# Patient Record
Sex: FEMALE | Race: WHITE | Employment: UNEMPLOYED | URBAN - METROPOLITAN AREA
[De-identification: names, ages, dates, MRNs, and addresses within clinical notes are randomized per-mention and may not be internally consistent; named-entity substitution may affect disease eponyms.]

---

## 2023-07-11 ENCOUNTER — TELEPHONE (OUTPATIENT)
Dept: BARIATRICS/WEIGHT MGMT | Age: 68
End: 2023-07-11

## 2023-07-18 ENCOUNTER — TELEPHONE (OUTPATIENT)
Dept: BARIATRICS/WEIGHT MGMT | Age: 68
End: 2023-07-18

## 2024-04-11 ENCOUNTER — HOSPITAL ENCOUNTER (EMERGENCY)
Facility: HOSPITAL | Age: 69
Discharge: HOME | End: 2024-04-11
Attending: EMERGENCY MEDICINE
Payer: MEDICARE

## 2024-04-11 ENCOUNTER — APPOINTMENT (OUTPATIENT)
Dept: RADIOLOGY | Facility: HOSPITAL | Age: 69
End: 2024-04-11
Payer: MEDICARE

## 2024-04-11 VITALS
OXYGEN SATURATION: 97 % | HEIGHT: 66 IN | SYSTOLIC BLOOD PRESSURE: 134 MMHG | RESPIRATION RATE: 16 BRPM | HEART RATE: 65 BPM | BODY MASS INDEX: 28.1 KG/M2 | WEIGHT: 174.82 LBS | TEMPERATURE: 97.7 F | DIASTOLIC BLOOD PRESSURE: 75 MMHG

## 2024-04-11 DIAGNOSIS — R51.9 ACUTE NONINTRACTABLE HEADACHE, UNSPECIFIED HEADACHE TYPE: Primary | ICD-10-CM

## 2024-04-11 PROCEDURE — 96375 TX/PRO/DX INJ NEW DRUG ADDON: CPT

## 2024-04-11 PROCEDURE — 96361 HYDRATE IV INFUSION ADD-ON: CPT

## 2024-04-11 PROCEDURE — 96372 THER/PROPH/DIAG INJ SC/IM: CPT | Performed by: EMERGENCY MEDICINE

## 2024-04-11 PROCEDURE — 70450 CT HEAD/BRAIN W/O DYE: CPT | Performed by: SURGERY

## 2024-04-11 PROCEDURE — 2500000004 HC RX 250 GENERAL PHARMACY W/ HCPCS (ALT 636 FOR OP/ED): Performed by: EMERGENCY MEDICINE

## 2024-04-11 PROCEDURE — 70450 CT HEAD/BRAIN W/O DYE: CPT

## 2024-04-11 PROCEDURE — 99284 EMERGENCY DEPT VISIT MOD MDM: CPT | Mod: 25

## 2024-04-11 PROCEDURE — 96374 THER/PROPH/DIAG INJ IV PUSH: CPT

## 2024-04-11 RX ORDER — KETOROLAC TROMETHAMINE 30 MG/ML
30 INJECTION, SOLUTION INTRAMUSCULAR; INTRAVENOUS ONCE
Status: COMPLETED | OUTPATIENT
Start: 2024-04-11 | End: 2024-04-11

## 2024-04-11 RX ORDER — PROCHLORPERAZINE EDISYLATE 5 MG/ML
10 INJECTION INTRAMUSCULAR; INTRAVENOUS ONCE
Status: COMPLETED | OUTPATIENT
Start: 2024-04-11 | End: 2024-04-11

## 2024-04-11 RX ADMIN — SODIUM CHLORIDE 1000 ML: 900 INJECTION, SOLUTION INTRAVENOUS at 05:54

## 2024-04-11 RX ADMIN — PROCHLORPERAZINE EDISYLATE 10 MG: 5 INJECTION INTRAMUSCULAR; INTRAVENOUS at 05:54

## 2024-04-11 RX ADMIN — KETOROLAC TROMETHAMINE 30 MG: 30 INJECTION, SOLUTION INTRAMUSCULAR at 05:54

## 2024-04-11 ASSESSMENT — COLUMBIA-SUICIDE SEVERITY RATING SCALE - C-SSRS
1. IN THE PAST MONTH, HAVE YOU WISHED YOU WERE DEAD OR WISHED YOU COULD GO TO SLEEP AND NOT WAKE UP?: NO
6. HAVE YOU EVER DONE ANYTHING, STARTED TO DO ANYTHING, OR PREPARED TO DO ANYTHING TO END YOUR LIFE?: NO
2. HAVE YOU ACTUALLY HAD ANY THOUGHTS OF KILLING YOURSELF?: NO

## 2024-04-11 ASSESSMENT — PAIN SCALES - GENERAL
PAINLEVEL_OUTOF10: 2
PAINLEVEL_OUTOF10: 6

## 2024-04-11 ASSESSMENT — PAIN DESCRIPTION - PAIN TYPE
TYPE: ACUTE PAIN
TYPE: ACUTE PAIN

## 2024-04-11 ASSESSMENT — PAIN - FUNCTIONAL ASSESSMENT
PAIN_FUNCTIONAL_ASSESSMENT: 0-10
PAIN_FUNCTIONAL_ASSESSMENT: 0-10

## 2024-04-11 ASSESSMENT — PAIN DESCRIPTION - LOCATION
LOCATION: HEAD
LOCATION: HEAD

## 2024-04-11 ASSESSMENT — PAIN DESCRIPTION - FREQUENCY: FREQUENCY: CONSTANT/CONTINUOUS

## 2024-04-11 ASSESSMENT — PAIN DESCRIPTION - DESCRIPTORS: DESCRIPTORS: HEADACHE

## 2024-04-11 ASSESSMENT — PAIN DESCRIPTION - PROGRESSION: CLINICAL_PROGRESSION: GRADUALLY IMPROVING

## 2024-04-11 ASSESSMENT — PAIN DESCRIPTION - ORIENTATION: ORIENTATION: RIGHT

## 2024-04-11 ASSESSMENT — PAIN DESCRIPTION - ONSET: ONSET: ONGOING

## 2024-04-11 NOTE — ED PROVIDER NOTES
HPI   Chief Complaint   Patient presents with    Headache     Pt started to have a headache 2 days ago. Pt has been taking Excedrin and the pain comes and goes. Pt states the pain has gotten worse.       HPI  69-year-old female presents complaining of a headache.  The patient had a headache for the last couple days.  She had it for 2 days and then yesterday it went away completely and she felt fine and then she woke up this morning with a headache.  It seems to be in the frontal region.  She is also been struggling with some TMJ and some ear pain that she has had over the last month.  She saw ENT who told her that she had a hole in her eardrum which she could repair but she has not pursued that yet.  She been taking Excedrin at home for headache.  Which helps but does not make it go away.  No nausea or vomiting.  No change in vision.  No trauma.  5 out of 10 pain.  No other complaints.                  No data recorded                   Patient History   History reviewed. No pertinent past medical history.  History reviewed. No pertinent surgical history.  No family history on file.  Social History     Tobacco Use    Smoking status: Not on file    Smokeless tobacco: Not on file   Substance Use Topics    Alcohol use: Not on file    Drug use: Not on file       Physical Exam   ED Triage Vitals [04/11/24 0512]   Temperature Heart Rate Respirations BP   36.5 °C (97.7 °F) 65 16 134/75      Pulse Ox Temp Source Heart Rate Source Patient Position   97 % Temporal Monitor Sitting      BP Location FiO2 (%)     Right arm --       Physical Exam  General:  Awake, alert, no acute distress.  Head: Normocephalic, Atraumatic, no tenderness palpation over right temporal artery  Eyes: Pupils are equal and reactive to light and accommodation extraocular motors intact  Neck: Supple, trachea midline, no stridor, no meningismus  Skin: Warm and dry, no rashes   Lungs: Clear to auscultation bilaterally no acute respiratory distress, speaking  in full sentences without difficulty  CV: Regular Rate Rhythm with no obvious murmurs gallops rubs noted, no jugular venous distention, no pedal edema   Neuro:  No gross focal neurologic deficits, NIH is 0  Musculoskeletal:  Full range of motion in all 4 extremities  Psychiatric:  Alert oriented x 3, Good insight into condition.  ED Course & MDM   Diagnoses as of 04/11/24 2142   Acute nonintractable headache, unspecified headache type       Medical Decision Making  Given the patient's age CT head is ordered.  Patient be treated with IV fluids, Toradol, Compazine.  Patient will be endorsed to the oncoming physician.    Procedure  Procedures     Michael Ferrari,   04/11/24 0539       Michael Ferrari,   04/11/24 2147

## 2024-04-11 NOTE — DISCHARGE INSTRUCTIONS
Continue taking your medications at home as needed, return to the ED for any new or concerning symptoms you feel requires emergent evaluation including infectious signs or symptoms like fevers, neck stiffness with worsening headache, confusion which may be signs of meningitis, headache with any new neurologic symptoms like numbness, weakness or paralysis, vision loss, slurred speech, difficulty speaking or swallowing which would require additional evaluation in the emergency department and may be signs of stroke.  Otherwise, you can follow-up with your primary doctor in case of persistent mild symptoms for further treatment recommendations.

## 2024-04-11 NOTE — ED PROVIDER NOTES
Care the patient was sent to me at 6 AM pending CT results and reassessment.  CT shows no acute intracranial process, no evidence of mass effect or intracranial hemorrhage or structural abnormality causing her headaches.  She feels much better after treatment in the emergency department and feels that she is ready to go home.  Presentation today is suggestive of migraine headache given response to migraine cocktail and otherwise benign evaluation.  She was referred to a primary care physician and neurology for follow-up given the frequency of her headaches.  She was discharged in improved condition.     Blanco Ramesh DO  04/11/24 0643

## 2024-11-20 ENCOUNTER — APPOINTMENT (OUTPATIENT)
Dept: PRIMARY CARE | Facility: CLINIC | Age: 69
End: 2024-11-20
Payer: MEDICARE

## 2024-11-20 VITALS
OXYGEN SATURATION: 95 % | HEIGHT: 66 IN | DIASTOLIC BLOOD PRESSURE: 73 MMHG | WEIGHT: 181 LBS | SYSTOLIC BLOOD PRESSURE: 113 MMHG | HEART RATE: 81 BPM | BODY MASS INDEX: 29.09 KG/M2

## 2024-11-20 DIAGNOSIS — Z13.820 ENCOUNTER FOR OSTEOPOROSIS SCREENING IN ASYMPTOMATIC POSTMENOPAUSAL PATIENT: ICD-10-CM

## 2024-11-20 DIAGNOSIS — Z78.0 ENCOUNTER FOR OSTEOPOROSIS SCREENING IN ASYMPTOMATIC POSTMENOPAUSAL PATIENT: ICD-10-CM

## 2024-11-20 DIAGNOSIS — Z13.6 SCREENING FOR HEART DISEASE: ICD-10-CM

## 2024-11-20 DIAGNOSIS — Z12.31 ENCOUNTER FOR SCREENING MAMMOGRAM FOR BREAST CANCER: ICD-10-CM

## 2024-11-20 DIAGNOSIS — Z00.00 MEDICARE ANNUAL WELLNESS VISIT, SUBSEQUENT: Primary | ICD-10-CM

## 2024-11-20 DIAGNOSIS — Z12.11 ENCOUNTER FOR SCREENING FOR MALIGNANT NEOPLASM OF COLON: ICD-10-CM

## 2024-11-20 DIAGNOSIS — R10.10 PAIN OF UPPER ABDOMEN: ICD-10-CM

## 2024-11-20 PROCEDURE — 1159F MED LIST DOCD IN RCRD: CPT

## 2024-11-20 PROCEDURE — 1160F RVW MEDS BY RX/DR IN RCRD: CPT

## 2024-11-20 PROCEDURE — G0439 PPPS, SUBSEQ VISIT: HCPCS

## 2024-11-20 PROCEDURE — 99213 OFFICE O/P EST LOW 20 MIN: CPT

## 2024-11-20 PROCEDURE — 1170F FXNL STATUS ASSESSED: CPT

## 2024-11-20 PROCEDURE — 1123F ACP DISCUSS/DSCN MKR DOCD: CPT

## 2024-11-20 PROCEDURE — 1036F TOBACCO NON-USER: CPT

## 2024-11-20 PROCEDURE — 1158F ADVNC CARE PLAN TLK DOCD: CPT

## 2024-11-20 PROCEDURE — 3008F BODY MASS INDEX DOCD: CPT

## 2024-11-20 ASSESSMENT — ACTIVITIES OF DAILY LIVING (ADL)
TAKING_MEDICATION: INDEPENDENT
DRESSING: INDEPENDENT
DOING_HOUSEWORK: INDEPENDENT
BATHING: INDEPENDENT
GROCERY_SHOPPING: INDEPENDENT
MANAGING_FINANCES: INDEPENDENT

## 2024-11-20 ASSESSMENT — ENCOUNTER SYMPTOMS
OCCASIONAL FEELINGS OF UNSTEADINESS: 0
LOSS OF SENSATION IN FEET: 0
DEPRESSION: 0

## 2024-11-20 ASSESSMENT — PATIENT HEALTH QUESTIONNAIRE - PHQ9
1. LITTLE INTEREST OR PLEASURE IN DOING THINGS: NOT AT ALL
SUM OF ALL RESPONSES TO PHQ9 QUESTIONS 1 AND 2: 0
2. FEELING DOWN, DEPRESSED OR HOPELESS: NOT AT ALL

## 2024-11-20 NOTE — PROGRESS NOTES
Subjective   Patient ID: Patsy Menendez is a 69 y.o. female who presents for Medicare Annual Wellness Visit Subsequent (Due for medicare wellness visit, mammogram, and colorectal cancer screening. She would like to discuss abdominal pain).    HPI     Pt is here for annual wellness.  Reports overall health is good. Her one concern is that she gets spasms in her abdomen and after the spasm she then has a bruised feeling to that area for a few days after. Denies fever or chills. Denies constipation or diarrhea. Does not take anything for the pain. Refusing vaccines.     Do you take any herbs or supplements that were not prescribed by a doctor? no  Are you taking calcium supplements? no  Are you taking aspirin daily? no  Colonoscopy: cologuard ordered  Fasting blood work: ordered  Last eye exam: two years ago  Last dental Exam: every six months  Exercise:4 times a week  Mood:stable  Sleep: not horrible 6-7 hours  Diet:  Fairly healthy  Occupation:  retired  Do you have pain that bothers you in your daily life? yes    1. Patient Counseling:  --Nutrition: Stressed importance of moderation in sodium/caffeine intake, saturated fat and cholesterol, caloric balance, sufficient intake of fresh fruits, vegetables, fiber, calcium, iron, and 1 mg of folate supplement per day (for females capable of pregnancy).  --Discussed the issue of estrogen replacement, calcium supplement, and the daily use of baby aspirin as appropriate per pt.  --Exercise: Stressed the importance of regular exercise.   --Substance Abuse: Discussed cessation/primary prevention of tobacco, alcohol, or other drug use; driving or other dangerous activities under the influence; availability of treatment for abuse.    --Sexuality: Discussed sexually transmitted diseases, partner selection, use of condoms, avoidance of unintended pregnancy  and contraceptive alternatives.   --Injury prevention: Discussed safety belts, safety helmets, smoke detector, smoking  "near bedding or upholstery.   --Dental health: Discussed importance of regular tooth brushing, flossing, and dental visits.  --Immunizations reviewed.  --Discussed benefits of colon cancer screening.  --After hours service discussed with patient  2 Discussed the patient's BMI.  The BMI is above average. No BMI management plan is appropriate.  3 Follow up in one year    Review of Systems    Objective   /73   Pulse 81   Ht 1.676 m (5' 6\")   Wt 82.1 kg (181 lb)   SpO2 95%   BMI 29.21 kg/m²     Physical Exam  Vitals reviewed.   Constitutional:       Appearance: Normal appearance.   Cardiovascular:      Rate and Rhythm: Normal rate and regular rhythm.      Pulses: Normal pulses.      Heart sounds: Normal heart sounds.   Pulmonary:      Effort: Pulmonary effort is normal.      Breath sounds: Normal breath sounds.   Neurological:      General: No focal deficit present.      Mental Status: She is alert and oriented to person, place, and time.   Psychiatric:         Mood and Affect: Mood normal.         Behavior: Behavior normal.       Assessment/Plan   Diagnoses and all orders for this visit:  Medicare annual wellness visit, subsequent  -     Comprehensive metabolic panel; Future  Encounter for screening mammogram for breast cancer  -     BI mammo bilateral screening tomosynthesis; Future  Pain of upper abdomen  -     US abdomen complete; Future  -     CBC and Auto Differential; Future  Encounter for screening for malignant neoplasm of colon  -     Cologuard® colon cancer screening; Future  Screening for heart disease  -     Lipid panel; Future  Encounter for osteoporosis screening in asymptomatic postmenopausal patient  -     XR DEXA bone density; Future       "

## 2024-11-22 ENCOUNTER — HOSPITAL ENCOUNTER (OUTPATIENT)
Dept: RADIOLOGY | Facility: HOSPITAL | Age: 69
Discharge: HOME | End: 2024-11-22
Payer: MEDICARE

## 2024-11-22 ENCOUNTER — LAB (OUTPATIENT)
Dept: LAB | Facility: LAB | Age: 69
End: 2024-11-22
Payer: MEDICARE

## 2024-11-22 VITALS — BODY MASS INDEX: 28.93 KG/M2 | HEIGHT: 66 IN | WEIGHT: 180 LBS

## 2024-11-22 DIAGNOSIS — R10.10 PAIN OF UPPER ABDOMEN: ICD-10-CM

## 2024-11-22 DIAGNOSIS — Z12.31 ENCOUNTER FOR SCREENING MAMMOGRAM FOR BREAST CANCER: ICD-10-CM

## 2024-11-22 DIAGNOSIS — Z00.00 MEDICARE ANNUAL WELLNESS VISIT, SUBSEQUENT: ICD-10-CM

## 2024-11-22 DIAGNOSIS — Z13.6 SCREENING FOR HEART DISEASE: ICD-10-CM

## 2024-11-22 DIAGNOSIS — Z13.820 ENCOUNTER FOR OSTEOPOROSIS SCREENING IN ASYMPTOMATIC POSTMENOPAUSAL PATIENT: ICD-10-CM

## 2024-11-22 DIAGNOSIS — Z78.0 ENCOUNTER FOR OSTEOPOROSIS SCREENING IN ASYMPTOMATIC POSTMENOPAUSAL PATIENT: ICD-10-CM

## 2024-11-22 LAB
ALBUMIN SERPL BCP-MCNC: 4.2 G/DL (ref 3.4–5)
ALP SERPL-CCNC: 109 U/L (ref 33–136)
ALT SERPL W P-5'-P-CCNC: 13 U/L (ref 7–45)
ANION GAP SERPL CALC-SCNC: 12 MMOL/L (ref 10–20)
AST SERPL W P-5'-P-CCNC: 12 U/L (ref 9–39)
BASOPHILS # BLD AUTO: 0.08 X10*3/UL (ref 0–0.1)
BASOPHILS NFR BLD AUTO: 0.9 %
BILIRUB SERPL-MCNC: 0.7 MG/DL (ref 0–1.2)
BUN SERPL-MCNC: 18 MG/DL (ref 6–23)
CALCIUM SERPL-MCNC: 9.3 MG/DL (ref 8.6–10.3)
CHLORIDE SERPL-SCNC: 103 MMOL/L (ref 98–107)
CHOLEST SERPL-MCNC: 174 MG/DL (ref 0–199)
CHOLESTEROL/HDL RATIO: 3.8
CO2 SERPL-SCNC: 29 MMOL/L (ref 21–32)
CREAT SERPL-MCNC: 0.77 MG/DL (ref 0.5–1.05)
EGFRCR SERPLBLD CKD-EPI 2021: 83 ML/MIN/1.73M*2
EOSINOPHIL # BLD AUTO: 0.17 X10*3/UL (ref 0–0.7)
EOSINOPHIL NFR BLD AUTO: 2 %
ERYTHROCYTE [DISTWIDTH] IN BLOOD BY AUTOMATED COUNT: 13.6 % (ref 11.5–14.5)
GLUCOSE SERPL-MCNC: 87 MG/DL (ref 74–99)
HCT VFR BLD AUTO: 48 % (ref 36–46)
HDLC SERPL-MCNC: 45.4 MG/DL
HGB BLD-MCNC: 15.5 G/DL (ref 12–16)
IMM GRANULOCYTES # BLD AUTO: 0.05 X10*3/UL (ref 0–0.7)
IMM GRANULOCYTES NFR BLD AUTO: 0.6 % (ref 0–0.9)
LDLC SERPL CALC-MCNC: 113 MG/DL
LYMPHOCYTES # BLD AUTO: 1.99 X10*3/UL (ref 1.2–4.8)
LYMPHOCYTES NFR BLD AUTO: 23.2 %
MCH RBC QN AUTO: 27.8 PG (ref 26–34)
MCHC RBC AUTO-ENTMCNC: 32.3 G/DL (ref 32–36)
MCV RBC AUTO: 86 FL (ref 80–100)
MONOCYTES # BLD AUTO: 0.61 X10*3/UL (ref 0.1–1)
MONOCYTES NFR BLD AUTO: 7.1 %
NEUTROPHILS # BLD AUTO: 5.66 X10*3/UL (ref 1.2–7.7)
NEUTROPHILS NFR BLD AUTO: 66.2 %
NON HDL CHOLESTEROL: 129 MG/DL (ref 0–149)
NRBC BLD-RTO: 0 /100 WBCS (ref 0–0)
PLATELET # BLD AUTO: 323 X10*3/UL (ref 150–450)
POTASSIUM SERPL-SCNC: 5.2 MMOL/L (ref 3.5–5.3)
PROT SERPL-MCNC: 6.3 G/DL (ref 6.4–8.2)
RBC # BLD AUTO: 5.57 X10*6/UL (ref 4–5.2)
SODIUM SERPL-SCNC: 139 MMOL/L (ref 136–145)
TRIGL SERPL-MCNC: 76 MG/DL (ref 0–149)
VLDL: 15 MG/DL (ref 0–40)
WBC # BLD AUTO: 8.6 X10*3/UL (ref 4.4–11.3)

## 2024-11-22 PROCEDURE — 77080 DXA BONE DENSITY AXIAL: CPT

## 2024-11-22 PROCEDURE — 76700 US EXAM ABDOM COMPLETE: CPT

## 2024-11-22 PROCEDURE — 85025 COMPLETE CBC W/AUTO DIFF WBC: CPT

## 2024-11-22 PROCEDURE — 80061 LIPID PANEL: CPT

## 2024-11-22 PROCEDURE — 36415 COLL VENOUS BLD VENIPUNCTURE: CPT

## 2024-11-22 PROCEDURE — 77067 SCR MAMMO BI INCL CAD: CPT

## 2024-11-22 PROCEDURE — 80053 COMPREHEN METABOLIC PANEL: CPT

## 2024-11-26 ENCOUNTER — HOSPITAL ENCOUNTER (OUTPATIENT)
Dept: RADIOLOGY | Facility: EXTERNAL LOCATION | Age: 69
Discharge: HOME | End: 2024-11-26

## 2024-11-26 DIAGNOSIS — Z12.31 ENCOUNTER FOR SCREENING MAMMOGRAM FOR BREAST CANCER: ICD-10-CM

## 2024-11-26 DIAGNOSIS — N28.1 KIDNEY CYSTS: Primary | ICD-10-CM

## 2024-11-27 DIAGNOSIS — R92.8 ABNORMAL MAMMOGRAM OF LEFT BREAST: Primary | ICD-10-CM

## 2024-12-06 LAB — NONINV COLON CA DNA+OCC BLD SCRN STL QL: NEGATIVE

## 2024-12-20 ENCOUNTER — HOSPITAL ENCOUNTER (OUTPATIENT)
Dept: RADIOLOGY | Facility: HOSPITAL | Age: 69
Discharge: HOME | End: 2024-12-20
Payer: MEDICARE

## 2024-12-20 DIAGNOSIS — R92.8 ABNORMAL MAMMOGRAM OF LEFT BREAST: ICD-10-CM

## 2024-12-20 PROCEDURE — 77061 BREAST TOMOSYNTHESIS UNI: CPT | Mod: LT

## 2024-12-20 PROCEDURE — 77065 DX MAMMO INCL CAD UNI: CPT | Mod: LEFT SIDE | Performed by: RADIOLOGY

## 2024-12-20 PROCEDURE — G0279 TOMOSYNTHESIS, MAMMO: HCPCS | Mod: LEFT SIDE | Performed by: RADIOLOGY

## 2025-01-10 ENCOUNTER — TELEPHONE (OUTPATIENT)
Dept: PRIMARY CARE | Facility: CLINIC | Age: 70
End: 2025-01-10
Payer: MEDICARE

## 2025-01-10 DIAGNOSIS — R10.9 LEFT SIDED ABDOMINAL PAIN: Primary | ICD-10-CM

## 2025-01-10 NOTE — TELEPHONE ENCOUNTER
Pt called asking for breast US results from 12/20/24  I checked to see if they were in and I was canceled. It looked like the appt was rescheduled for 1/13/25 at 1:45. I called and LVM to inform patient of this information.

## 2025-01-10 NOTE — TELEPHONE ENCOUNTER
Pt came to office and Celina spoke to Patsy about the abnormal mammogram. Pt was informed to get left breast US done

## 2025-01-13 ENCOUNTER — HOSPITAL ENCOUNTER (OUTPATIENT)
Dept: RADIOLOGY | Facility: HOSPITAL | Age: 70
Discharge: HOME | End: 2025-01-13
Payer: MEDICARE

## 2025-01-13 DIAGNOSIS — R10.9 LEFT SIDED ABDOMINAL PAIN: ICD-10-CM

## 2025-01-13 PROCEDURE — 76705 ECHO EXAM OF ABDOMEN: CPT

## 2025-01-13 PROCEDURE — 76642 ULTRASOUND BREAST LIMITED: CPT | Mod: LT

## 2025-01-13 PROCEDURE — 76982 USE 1ST TARGET LESION: CPT | Mod: LT

## 2025-01-13 PROCEDURE — 76705 ECHO EXAM OF ABDOMEN: CPT | Performed by: STUDENT IN AN ORGANIZED HEALTH CARE EDUCATION/TRAINING PROGRAM

## 2025-01-14 ENCOUNTER — APPOINTMENT (OUTPATIENT)
Dept: RADIOLOGY | Facility: HOSPITAL | Age: 70
End: 2025-01-14
Payer: MEDICARE

## 2025-01-20 NOTE — PROGRESS NOTES
Subjective   Patient ID: Patsy Menendez is a 69 y.o. female who presents for left US breast biopsy consult Bx 1/30.  HPI  Patient is new to clinic and presents for eft US breast biopsy consult Bx 1/30 .  Patient is referred by Olivia OKEEFE.  Patient seen Olivia OKEEFE in office on 11/20/2024 for annual exam. Order was for annual BI mammogram screening.  Patient had BI mammogram Bilateral screening tomosynthesis completed on 11/22/2024. Impression was Left breast mass with calcification. Additional mammographic and sonographic imaging recommended.No evidence of malignancy right breast. Due to results additional orders were placed to be done.  Patient had BI mammogram left diagnostic tomosynthesis completed  on 12/20/2024. Impression was Highly suspicious mammographic mass for which ultrasound evaluation is still recommended.. The patient reported to the technologist she would return for the sonographic evaluation. Due to result additional testing ordered.  Patient had BI US breast limited left completed on 1/13/2025. Impression was At the 12 o'clock position left breast with a distance from nipple  of 5 cm is a spiculated mass demonstrating marked acoustic shadowing  and increased stiffness on elastography, highly suspicious for malignancy. Biopsy is recommended. Need for biopsy was discussed with the patient at the time of the exam. The 2 o'clock position there is a focal area of oval  fibroglandular appearing tissue likely representing asymmetric breast  tissue based on this finding and previous mammographic findings.    BI US BREAST LIMITED LEFT; 1/13/2025 1:08 pm      INDICATION:  Signs/Symptoms:abnormal mammogram.      COMPARISON:  Multilevel previous mammograms including most recent prior studies  from 12/20/2024 and 11/22/2024      ACCESSION NUMBER(S):  [id number]      ORDERING CLINICIAN:  OLIVIA WHITAKER      TECHNIQUE:  Grey scale duplex image of the breast tissue was obtained.       FINDINGS:  At the 12 o'clock position left breast with a distance from nipple of  3 cm is a large area of acoustic shadowing in association with a  taller than wide mass measuring on the order of 1.3 x 1.3 x 1.1 cm,  demonstrating increased stiffness on elastography, without internal  vascularity appreciated at this time. This is highly suspicious for  malignancy and biopsy is recommended.      Axillary lymph node evaluation demonstrated nonspecific axillary  lymph nodes without evidence for lymphadenopathy.      At the 2 o'clock position left breast with a distance from nipple of  5 cm there is a wider than tall area of mixed echogenicity, similar  to the remainder of the breast tissue, likely representing a focal  area of asymmetric breast tissue measuring 1.1 x 1.4 x 2.1 cm. This  corresponds with a focal fibroglandular density upper-outer quadrant  left breast mid depth mammographically, thought to be unchanged  dating back to outside mammograms from 11/18/2017. Peripheral  vascularity was demonstrated. Mild increased stiffness was identified  on elastography.      IMPRESSION:  1. At the 12 o'clock position left breast with a distance from nipple  of 5 cm is a spiculated mass demonstrating marked acoustic shadowing  and increased stiffness on elastography, highly suspicious for  malignancy. Biopsy is recommended. Need for biopsy was discussed with  the patient at the time of the exam.  2. The 2 o'clock position there is a focal area of oval  fibroglandular appearing tissue likely representing asymmetric breast  tissue based on this finding and previous mammographic findings.      MACRO:  Critical Finding:  See findings. Notification was initiated on  1/13/2025 at 1:30 pm by  Ap Ulloa.  (**-[facility]-**) Instructions:  Surgical Consultation and Imaging Guided Biopsy.    BI MAMMO LEFT DIAGNOSTIC TOMOSYNTHESIS;  12/20/2024 10:35 am      ACCESSION NUMBER(S):  [id number]      ORDERING CLINICIAN:  OLIVIA WHITAKER       INDICATION:  Callback from screening      ,R92.8 Other abnormal and inconclusive findings on diagnostic imaging  of breast      COMPARISON:  11/22/2020      FINDINGS:  MAMMOGRAPHY: 2D and tomosynthesis images were reviewed at 1 mm slice  thickness.      Density:  The breasts are heterogeneously dense, which may obscure  small masses.      The patient is recalled from screening mammogram for mass in the left  breast. Along the 12 o'clock position of the left breast at an  anterior depth the mass is noted again. It is a hyperdense spiculated  mass with a few associated microcalcifications. No other suspicious  masses or calcifications are identified.      Sonographic evaluation was recommended. The patient could not stay  for the sonographic evaluation and unfortunately the patient had left  the breast center before preliminary results could be communicated to  her. Sonographic evaluation is still recommended.      IMPRESSION:  Highly suspicious mammographic mass for which ultrasound evaluation  is still recommended.. The patient reported to the technologist she  would return for the sonographic evaluation. The ordering provider  was also contacted.      BI-RADS CATEGORY:  BI-RADS Category:  0 Incomplete; Need Additional Imaging Evaluation  Recommendation:  Additional Imaging.  Recommended Date:  Immediate.  Laterality:  Left      MACRO:  None      BI MAMMO BILATERAL SCREENING TOMOSYNTHESIS;  11/22/2024 3:18 pm      ACCESSION NUMBER(S):  [id number]      ORDERING CLINICIAN:  OLIVIA WHITAKER      INDICATION:  Screening. Benign bilateral excisional breast biopsies.      ,Z12.31 Encounter for screening mammogram for malignant neoplasm of  breast      COMPARISON:  Bilateral mammogram 11/18/2017 with additional imaging 12/15/2017,  02/05/2011, 02/04/2010      FINDINGS:  2D and tomosynthesis images were reviewed at 1 mm slice thickness.      Density:  The breasts are heterogeneously dense, which may obscure  small  masses.      There is a spiculated mass with calcification in the central superior  left breast at mid depth. There is postsurgical scarring in the upper  outer left breast, upper-outer right breast, and medial inferior  right breast, sites of excisional breast biopsy. There are stable  bilateral circumscribed masses. No suspicious masses or  calcifications are identified in the right breast.      IMPRESSION:  Left breast mass with calcification. Additional mammographic and  sonographic imaging recommended.      No evidence of malignancy right breast.      BI-RADS CATEGORY:  BI-RADS Category:  0 Incomplete; Need Additional Imaging Evaluation  Recommendation:  Additional Imaging.  Recommended Date:  Immediate.  Laterality:  Left.      MACRO:  None    Previous Biopsy: No Menarche Age: 12 Age of first delivery: 16 Breastfeed: Tried to Menopause age: 42 HRT: No Family history of breast cancer: no Family history of ovarian cancer: no Family history of pancreatic cancer: no G 4 P 3     Social History:  Tobacco Use - Former  Do you use any recreational drugs - No  Alcohol Use - Yes   Caffeine Intake - yes  Working - Retired   Marital status -   Living with -  Spouse     Past Medical History:   Diagnosis Date    Carpal tunnel syndrome      Past Surgical History:   Procedure Laterality Date    APPENDECTOMY      BREAST SURGERY Bilateral     bilateral breast cyst surgery for fibroids    CARPAL TUNNEL RELEASE       SECTION, LOW TRANSVERSE      x3 in the 70s    CHOLECYSTECTOMY      laparoscopic    HYSTERECTOMY      bilateral tubes removed    LAPAROTOMY OOPHERECTOMY Bilateral     MANDIBLE SURGERY      TMJ implant     Family History   Problem Relation Name Age of Onset    Cancer Brother      Brain cancer Mother's Sister      Stroke Maternal Grandmother       Allergies   Allergen Reactions    Phenylephrine Shortness of breath     Other reaction(s): Difficulty Breathing    Pseudoephedrine Hcl Shortness of  "breath and Unknown    Chicken Derived Swelling     [facility]    Loratadine Unknown    Morphine Nausea/vomiting and Unknown     Other reaction(s): Vomiting         Review of Systems  /66 (BP Location: Left arm, Patient Position: Sitting, BP Cuff Size: Adult)   Pulse 68   Temp 36.5 °C (97.7 °F) (Temporal)   Resp 17   Ht 1.676 m (5' 6\")   Wt 81.6 kg (180 lb)   SpO2 96%   BMI 29.05 kg/m²     Objective   Physical Exam  Physical Exam:  /66 (BP Location: Left arm, Patient Position: Sitting, BP Cuff Size: Adult)   Pulse 68   Temp 36.5 °C (97.7 °F) (Temporal)   Resp 17   Ht 1.676 m (5' 6\")   Wt 81.6 kg (180 lb)   SpO2 96%   BMI 29.05 kg/m²    GENERAL APPEARANCE: Patient appears in no acute distress.   EYES: Sclera non-icteric, PERRLA.   ENT Normal appearance of ears and nose.   NECK/THYROID: Neck: no masses. Thyroid: no masses.   LYMPH NODES: No cervical or supraclavicular lymphadenopathy.   CARDIOVASCULAR Heart: RRR, no murmurs; Carotid bruits: none; Peripheral edema: none.   RESPIRATORY: Lungs: Bilateral inspiratory and expiratory wheezing; no respiratory distress.   BREASTS: Exam done both in upright and supine position. On inspection no skin dimpling, no peau d'orange; Masses: none palpable in either breast.  Axillary lymphadenopathy: none.   GI (ABDOMEN) No intraabdominal mass appreciated, no hepatosplenomegaly; Hernia: none; Tenderness: none.   PSYCH: Patient oriented to time, place and person, normal affect.    Assessment/Plan   Problem List Items Addressed This Visit             ICD-10-CM    Mass overlapping multiple quadrants of left breast - Primary N63.25     With a 1.3 cm mass left breast 12:00 approximately 3 cm D FN suspicious for adenocarcinoma.  Recommend ultrasound-guided core biopsy. Patient to be scheduled for ultrasound guided  biopsy of the breast in the radiology department. Risk, benefits and alternatives to this plan were thoroughly discussed with the patient who agrees to proceed. "  The procedure was also thoroughly discussed as well as her follow-up. She is to see me in the office in one week but I also will call as soon as I see the pathology which is usually 4 working days from procedure.          Mass of upper outer quadrant of left breast N63.21     Patient with a 2.1 cm asymmetry left breast 2 o'clock position 5 cm D FN that appears to be unchanged from previous imaging.  If the 12:00 lesion turns out to be a cancer patient is a good candidate for preoperative MRI                 Halley De La Vega MA 01/27/25 2:33 PM

## 2025-01-22 ENCOUNTER — TELEPHONE (OUTPATIENT)
Dept: PRIMARY CARE | Facility: CLINIC | Age: 70
End: 2025-01-22
Payer: MEDICARE

## 2025-01-22 NOTE — TELEPHONE ENCOUNTER
Patsy called today she would like to know the results  to the left breast US and the US of the abdomen. Can you please result them? She got them done on 1/13/25. Thank you

## 2025-01-27 ENCOUNTER — OFFICE VISIT (OUTPATIENT)
Dept: SURGERY | Facility: CLINIC | Age: 70
End: 2025-01-27
Payer: MEDICARE

## 2025-01-27 ENCOUNTER — TELEPHONE (OUTPATIENT)
Dept: PRIMARY CARE | Facility: CLINIC | Age: 70
End: 2025-01-27
Payer: MEDICARE

## 2025-01-27 VITALS
BODY MASS INDEX: 28.93 KG/M2 | HEIGHT: 66 IN | TEMPERATURE: 97.7 F | OXYGEN SATURATION: 96 % | RESPIRATION RATE: 17 BRPM | DIASTOLIC BLOOD PRESSURE: 66 MMHG | WEIGHT: 180 LBS | HEART RATE: 68 BPM | SYSTOLIC BLOOD PRESSURE: 107 MMHG

## 2025-01-27 DIAGNOSIS — N63.21 MASS OF UPPER OUTER QUADRANT OF LEFT BREAST: ICD-10-CM

## 2025-01-27 DIAGNOSIS — N63.25 MASS OVERLAPPING MULTIPLE QUADRANTS OF LEFT BREAST: Primary | ICD-10-CM

## 2025-01-27 PROCEDURE — 1123F ACP DISCUSS/DSCN MKR DOCD: CPT | Performed by: SURGERY

## 2025-01-27 PROCEDURE — 99214 OFFICE O/P EST MOD 30 MIN: CPT | Performed by: SURGERY

## 2025-01-27 PROCEDURE — 1159F MED LIST DOCD IN RCRD: CPT | Performed by: SURGERY

## 2025-01-27 PROCEDURE — 3008F BODY MASS INDEX DOCD: CPT | Performed by: SURGERY

## 2025-01-27 PROCEDURE — 1125F AMNT PAIN NOTED PAIN PRSNT: CPT | Performed by: SURGERY

## 2025-01-27 PROCEDURE — 99204 OFFICE O/P NEW MOD 45 MIN: CPT | Performed by: SURGERY

## 2025-01-27 ASSESSMENT — COLUMBIA-SUICIDE SEVERITY RATING SCALE - C-SSRS
6. HAVE YOU EVER DONE ANYTHING, STARTED TO DO ANYTHING, OR PREPARED TO DO ANYTHING TO END YOUR LIFE?: NO
1. IN THE PAST MONTH, HAVE YOU WISHED YOU WERE DEAD OR WISHED YOU COULD GO TO SLEEP AND NOT WAKE UP?: NO
2. HAVE YOU ACTUALLY HAD ANY THOUGHTS OF KILLING YOURSELF?: NO

## 2025-01-27 ASSESSMENT — PATIENT HEALTH QUESTIONNAIRE - PHQ9
SUM OF ALL RESPONSES TO PHQ9 QUESTIONS 1 & 2: 0
1. LITTLE INTEREST OR PLEASURE IN DOING THINGS: NOT AT ALL
2. FEELING DOWN, DEPRESSED OR HOPELESS: NOT AT ALL
SUM OF ALL RESPONSES TO PHQ9 QUESTIONS 1 & 2: 0
1. LITTLE INTEREST OR PLEASURE IN DOING THINGS: NOT AT ALL
2. FEELING DOWN, DEPRESSED OR HOPELESS: NOT AT ALL

## 2025-01-27 ASSESSMENT — ENCOUNTER SYMPTOMS
OCCASIONAL FEELINGS OF UNSTEADINESS: 0
DEPRESSION: 0
LOSS OF SENSATION IN FEET: 0

## 2025-01-27 ASSESSMENT — LIFESTYLE VARIABLES
AUDIT-C TOTAL SCORE: 2
HOW MANY STANDARD DRINKS CONTAINING ALCOHOL DO YOU HAVE ON A TYPICAL DAY: 1 OR 2
SKIP TO QUESTIONS 9-10: 1
HOW OFTEN DO YOU HAVE SIX OR MORE DRINKS ON ONE OCCASION: NEVER
HOW OFTEN DO YOU HAVE A DRINK CONTAINING ALCOHOL: 2-4 TIMES A MONTH

## 2025-01-27 ASSESSMENT — PAIN SCALES - GENERAL: PAINLEVEL_OUTOF10: 3

## 2025-01-27 NOTE — ASSESSMENT & PLAN NOTE
With a 1.3 cm mass left breast 12:00 approximately 3 cm D FN suspicious for adenocarcinoma.  Recommend ultrasound-guided core biopsy. Patient to be scheduled for ultrasound guided  biopsy of the breast in the radiology department. Risk, benefits and alternatives to this plan were thoroughly discussed with the patient who agrees to proceed.  The procedure was also thoroughly discussed as well as her follow-up. She is to see me in the office in one week but I also will call as soon as I see the pathology which is usually 4 working days from procedure.

## 2025-01-27 NOTE — ASSESSMENT & PLAN NOTE
Patient with a 2.1 cm asymmetry left breast 2 o'clock position 5 cm D FN that appears to be unchanged from previous imaging.  If the 12:00 lesion turns out to be a cancer patient is a good candidate for preoperative MRI

## 2025-01-30 ENCOUNTER — HOSPITAL ENCOUNTER (OUTPATIENT)
Dept: RADIOLOGY | Facility: HOSPITAL | Age: 70
Discharge: HOME | End: 2025-01-30
Payer: MEDICARE

## 2025-01-30 DIAGNOSIS — R92.8 OTHER ABNORMAL AND INCONCLUSIVE FINDINGS ON DIAGNOSTIC IMAGING OF BREAST: ICD-10-CM

## 2025-01-30 PROCEDURE — 77065 DX MAMMO INCL CAD UNI: CPT | Mod: LEFT SIDE | Performed by: RADIOLOGY

## 2025-01-30 PROCEDURE — A4648 IMPLANTABLE TISSUE MARKER: HCPCS

## 2025-01-30 PROCEDURE — 19083 BX BREAST 1ST LESION US IMAG: CPT | Mod: LEFT SIDE | Performed by: RADIOLOGY

## 2025-01-30 PROCEDURE — 2500000004 HC RX 250 GENERAL PHARMACY W/ HCPCS (ALT 636 FOR OP/ED): Performed by: RADIOLOGY

## 2025-01-30 PROCEDURE — 19083 BX BREAST 1ST LESION US IMAG: CPT | Mod: LT

## 2025-01-30 PROCEDURE — 2720000007 HC OR 272 NO HCPCS

## 2025-01-30 RX ORDER — LIDOCAINE HYDROCHLORIDE 10 MG/ML
10 INJECTION, SOLUTION EPIDURAL; INFILTRATION; INTRACAUDAL; PERINEURAL ONCE
Status: COMPLETED | OUTPATIENT
Start: 2025-01-30 | End: 2025-01-30

## 2025-01-30 RX ADMIN — LIDOCAINE HYDROCHLORIDE 11 ML: 10 INJECTION, SOLUTION EPIDURAL; INFILTRATION; INTRACAUDAL; PERINEURAL at 09:48

## 2025-01-30 ASSESSMENT — PAIN SCALES - GENERAL
PAINLEVEL_OUTOF10: 2
PAINLEVEL_OUTOF10: 0 - NO PAIN
PAINLEVEL_OUTOF10: 0 - NO PAIN

## 2025-01-31 ENCOUNTER — TELEPHONE (OUTPATIENT)
Dept: PRIMARY CARE | Facility: CLINIC | Age: 70
End: 2025-01-31
Payer: MEDICARE

## 2025-01-31 NOTE — TELEPHONE ENCOUNTER
Returning missed call from Celina to go over recent test results    Last office visit: 11/20/2024  Next office visit: Visit date not found

## 2025-02-03 ENCOUNTER — APPOINTMENT (OUTPATIENT)
Dept: UROLOGY | Facility: CLINIC | Age: 70
End: 2025-02-03
Payer: MEDICARE

## 2025-02-03 ENCOUNTER — TELEPHONE (OUTPATIENT)
Dept: UROLOGY | Facility: CLINIC | Age: 70
End: 2025-02-03

## 2025-02-03 NOTE — TELEPHONE ENCOUNTER
Attempted to contact patient to reschedule her NPV due to provider being unavailable. Left office phpne number and business hours. Asked patient to call us back to reschedule.

## 2025-02-07 LAB
LAB AP ASR DISCLAIMER: NORMAL
LAB AP BLOCK FOR ADDITIONAL STUDIES: NORMAL
LABORATORY COMMENT REPORT: NORMAL
PATH REPORT.FINAL DX SPEC: NORMAL
PATH REPORT.GROSS SPEC: NORMAL
PATH REPORT.RELEVANT HX SPEC: NORMAL
PATH REPORT.TOTAL CANCER: NORMAL
PATHOLOGY SYNOPTIC REPORT: NORMAL

## 2025-02-11 ENCOUNTER — TELEPHONE (OUTPATIENT)
Dept: SURGERY | Facility: CLINIC | Age: 70
End: 2025-02-11
Payer: MEDICARE

## 2025-02-11 DIAGNOSIS — F41.9 ANXIETY: ICD-10-CM

## 2025-02-12 ENCOUNTER — TELEPHONE (OUTPATIENT)
Dept: SURGERY | Facility: CLINIC | Age: 70
End: 2025-02-12
Payer: MEDICARE

## 2025-02-12 DIAGNOSIS — N63.25 MASS OVERLAPPING MULTIPLE QUADRANTS OF LEFT BREAST: Primary | ICD-10-CM

## 2025-02-12 NOTE — TELEPHONE ENCOUNTER
Patient is scheduled to have MRI of the breast done on 2/20/2025 prior to her appointment with Dr. Martino. Patient when scheduling with radiology was told due to her claustrophobia she will need a prescription for valium sent into her pharmacy to take prior to imaging.    Dr. Martino please sign the order for prescription. Will be in your basket to sign. Please fix dosage if not right. It will not let me add the pharmacy [facility] [place].    Viridiana Louise CMA

## 2025-02-14 RX ORDER — DIAZEPAM 5 MG/1
5 TABLET ORAL EVERY 8 HOURS PRN
Qty: 3 TABLET | Refills: 0 | Status: SHIPPED | OUTPATIENT
Start: 2025-02-14

## 2025-02-17 ENCOUNTER — PATIENT MESSAGE (OUTPATIENT)
Dept: SURGERY | Facility: CLINIC | Age: 70
End: 2025-02-17
Payer: MEDICARE

## 2025-02-20 ENCOUNTER — APPOINTMENT (OUTPATIENT)
Dept: RADIOLOGY | Facility: CLINIC | Age: 70
End: 2025-02-20
Payer: MEDICARE

## 2025-02-20 NOTE — PROGRESS NOTES
Subjective   Patient ID: Patsy Menendez is a 69 y.o. female who presents for discussion of breast biopsy results.  HPI  Patient returns to clinic and presents for discussion of breast biopsy results.  Patient was last seen in clinic on 1/27/2025 for left US biopsy consult , bx on 1/30.  Surgical pathology was collected and finalized.    FINAL DIAGNOSIS   A. Left breast, 12:00 3 cm from nipple, ultrasound guided core needle biopsy:  -- Invasive ductal carcinoma, grade 2-3, see note.  -- Ductal carcinoma in situ, intermediate nuclear grade, solid and cribriform patterns with necrosis and calcifications.     Note: E-cadherin shows strong positive membraneous staining, supporting ductal phenotype .In this limited sample, the invasive carcinoma measures up to 10 mm in greatest dimension.  ER, RI and HER2 will be reported in an addendum.      Electronically signed by Jazmín Bradshaw MD on 2/7/2025 at [address] [place] [address]   By the signature on this report, the individual or group listed as making the Final Interpretation/Diagnosis certifies that they have reviewed this case.    Case Summary Report   Breast Biomarker Reporting Template   Protocol posted: 12/13/2023BREAST BIOMARKER REPORTING TEMPLATE - All Specimens  Test(s) Performed     Estrogen Receptor (ER) Status  Positive (greater than 10% of cells demonstrate nuclear positivity)   Percentage of Cells with Nuclear Positivity  %   Average Intensity of Staining  Strong   Test Type  Food and Drug Administration (FDA) cleared (test / vendor): Roche CONFIRM anti-(ER) (SP1) Rabbit Monoclonal Primary Antibody, Roche Multimer Detection   Primary Antibody  SP1   Scoring System  No separate scoring system used   Test(s) Performed     Progesterone Receptor (PgR) Status  Positive   Percentage of Cells with Nuclear Positivity  %   Average Intensity of Staining  Moderate   Test Type  Food and Drug Administration (FDA) cleared (test / vendor): Roche CONFIRM anti-(RI) (1E2)  Rabbit Monoclonal Primary Anitbody, Roche Multimer Detection   Primary Antibody  1E2   Scoring System  No separate scoring system used   Test(s) Performed     HER2 by Immunohistochemistry  Negative (Score 1+)   Test Type  Food and Drug Administration (FDA) cleared (test / vendor): Roche Pathway anti-HER-2/elizabeth (4B5) Rabbit Monoclonal Primary Antibody, Roche Multimer Detection   Primary Antibody  4B5   Cold Ischemia and Fixation Times  Cannot be determined: Time specimen placed in formalin not specified   Testing Performed on Block Number(s)  A1   METHODS   Fixative  Formalin   Image Analysis  Not performed   Comment(s)  OH stain intesity is moderate to strong   .      Block for Additional Biomarkers/Molecular Studies  [facility]      Tumor Block: A1       Social History:  Tobacco Use - Former  Do you use any recreational drugs - No  Alcohol Use - Yes   Caffeine Intake - yes  Working - Retired   Marital status -   Living with -  Spouse    Past Medical History:   Diagnosis Date    Carpal tunnel syndrome      Family History   Problem Relation Name Age of Onset    Cancer Brother      Brain cancer Mother's Sister      Stroke Maternal Grandmother       Past Surgical History:   Procedure Laterality Date    APPENDECTOMY      BREAST BIOPSY Left 2025    BREAST SURGERY Bilateral     bilateral breast cyst surgery for fibroids    CARPAL TUNNEL RELEASE       SECTION, LOW TRANSVERSE      x3 in the 70s    CHOLECYSTECTOMY      laparoscopic    HYSTERECTOMY      bilateral tubes removed    LAPAROTOMY OOPHERECTOMY Bilateral     MANDIBLE SURGERY      TMJ implant     Allergies   Allergen Reactions    Phenylephrine Shortness of breath     Other reaction(s): Difficulty Breathing    Pseudoephedrine Hcl Shortness of breath and Unknown    Chicken Derived Swelling     KFC    Loratadine Unknown    Morphine Nausea/vomiting and Unknown     Other reaction(s): Vomiting       Review of Systems  /69 (BP Location: Right  "arm, Patient Position: Sitting, BP Cuff Size: Adult)   Pulse 72   Temp 36.6 °C (97.8 °F) (Temporal)   Resp 17   Ht 1.676 m (5' 6\")   Wt 81.6 kg (180 lb)   SpO2 96%   BMI 29.05 kg/m²    Objective   Physical Exam    Biopsy site healed nicely  Assessment/Plan   Problem List Items Addressed This Visit             ICD-10-CM    Infiltrating ductal carcinoma of left breast (Multi) - Primary C50.912      patient with approximately 1.3 cm invasive ductal carcinoma grade 2-3 out of 3 ER/IA positive HER2/elizabeth negative.  Pathology was thoroughly reviewed with the patient.  Patient had an MRI bilateral breast and now has issues with lesion seen in the right breast.  We will coordinate for biopsy of those areas in the right breast and she will have 1 more follow-up visit before her surgery.  At this time we reviewed her surgical plan which would include Magseed localization lumpectomy left breast with sentinel node biopsy.  Risk-benefit alternative doing the surgery were thoroughly discussed with the patient agrees to proceed.  We did discuss the option of mastectomy but this was not recommended considering her tumor characteristics.     patient is unknown family history with her mother and that side of the family as a result recommending genetic testing.         Mass of upper outer quadrant of right breast N63.11       Patient with 2 new masses seen in the right breast 1 in the upper outer quadrant 1 in the lower medial quadrant.  These require biopsy prior to proceeding with her left breast cancer surgery.  Risk of its alternatives of doing the biopsies were thoroughly discussed with the patient who agrees to proceed.  We will bring her back for 1 last visit prior to surgery after the biopsies are complete            Breast History:    Patient is new to clinic and presents for eft US breast biopsy consult Bx 1/30 .  Patient is referred by Celina OKEEFE.  Patient seen Celina OKEEFE in office on " 11/20/2024 for annual exam. Order was for annual BI mammogram screening.  Patient had BI mammogram Bilateral screening tomosynthesis completed on 11/22/2024. Impression was Left breast mass with calcification. Additional mammographic and sonographic imaging recommended.No evidence of malignancy right breast. Due to results additional orders were placed to be done.  Patient had BI mammogram left diagnostic tomosynthesis completed  on 12/20/2024. Impression was Highly suspicious mammographic mass for which ultrasound evaluation is still recommended.. The patient reported to the technologist she would return for the sonographic evaluation. Due to result additional testing ordered.  Patient had BI US breast limited left completed on 1/13/2025. Impression was At the 12 o'clock position left breast with a distance from nipple  of 5 cm is a spiculated mass demonstrating marked acoustic shadowing  and increased stiffness on elastography, highly suspicious for malignancy. Biopsy is recommended. Need for biopsy was discussed with the patient at the time of the exam. The 2 o'clock position there is a focal area of oval  fibroglandular appearing tissue likely representing asymmetric breast  tissue based on this finding and previous mammographic findings.  With a 1.3 cm mass left breast 12:00 approximately 3 cm D FN suspicious for adenocarcinoma.  Recommend ultrasound-guided core biopsy. Patient to be scheduled for ultrasound guided  biopsy of the breast in the radiology department. Risk, benefits and alternatives to this plan were thoroughly discussed with the patient who agrees to proceed.  The procedure was also thoroughly discussed as well as her follow-up. She is to see me in the office in one week but I also will call as soon as I see the pathology which is usually 4 working days from procedure.   Patient with a 2.1 cm asymmetry left breast 2 o'clock position 5 cm D FN that appears to be unchanged from previous imaging. If  the 12:00 lesion turns out to be a cancer patient is a good candidate for preoperative MRI    Previous Biopsy: No Menarche Age: 12 Age of first delivery: 16 Breastfeed: Tried to Menopause age: 42 HRT: No Family history of breast cancer: no Family history of ovarian cancer: no Family history of pancreatic cancer: no G 4 P 3     Bonnie Martino MD 02/27/25 1:09 PM

## 2025-02-21 ENCOUNTER — HOSPITAL ENCOUNTER (OUTPATIENT)
Dept: RADIOLOGY | Facility: HOSPITAL | Age: 70
Discharge: HOME | End: 2025-02-21
Payer: MEDICARE

## 2025-02-21 DIAGNOSIS — N63.25 MASS OVERLAPPING MULTIPLE QUADRANTS OF LEFT BREAST: ICD-10-CM

## 2025-02-21 PROCEDURE — 77049 MRI BREAST C-+ W/CAD BI: CPT

## 2025-02-21 PROCEDURE — A9575 INJ GADOTERATE MEGLUMI 0.1ML: HCPCS | Performed by: SURGERY

## 2025-02-21 PROCEDURE — 2550000001 HC RX 255 CONTRASTS: Performed by: SURGERY

## 2025-02-21 RX ORDER — GADOTERATE MEGLUMINE 376.9 MG/ML
16 INJECTION INTRAVENOUS
Status: COMPLETED | OUTPATIENT
Start: 2025-02-21 | End: 2025-02-21

## 2025-02-21 RX ADMIN — GADOTERATE MEGLUMINE 16 ML: 376.9 INJECTION INTRAVENOUS at 18:32

## 2025-02-25 DIAGNOSIS — N63.11 MASS OF UPPER OUTER QUADRANT OF RIGHT BREAST: Primary | ICD-10-CM

## 2025-02-27 ENCOUNTER — OFFICE VISIT (OUTPATIENT)
Dept: SURGERY | Facility: CLINIC | Age: 70
End: 2025-02-27
Payer: MEDICARE

## 2025-02-27 ENCOUNTER — APPOINTMENT (OUTPATIENT)
Dept: RADIOLOGY | Facility: CLINIC | Age: 70
End: 2025-02-27
Payer: MEDICARE

## 2025-02-27 ENCOUNTER — APPOINTMENT (OUTPATIENT)
Dept: SURGERY | Facility: CLINIC | Age: 70
End: 2025-02-27
Payer: MEDICARE

## 2025-02-27 VITALS
TEMPERATURE: 97.8 F | OXYGEN SATURATION: 96 % | DIASTOLIC BLOOD PRESSURE: 69 MMHG | BODY MASS INDEX: 28.93 KG/M2 | HEIGHT: 66 IN | RESPIRATION RATE: 17 BRPM | SYSTOLIC BLOOD PRESSURE: 130 MMHG | WEIGHT: 180 LBS | HEART RATE: 72 BPM

## 2025-02-27 DIAGNOSIS — C50.912 INFILTRATING DUCTAL CARCINOMA OF LEFT BREAST (MULTI): Primary | ICD-10-CM

## 2025-02-27 DIAGNOSIS — N63.11 MASS OF UPPER OUTER QUADRANT OF RIGHT BREAST: ICD-10-CM

## 2025-02-27 PROCEDURE — 1036F TOBACCO NON-USER: CPT | Performed by: SURGERY

## 2025-02-27 PROCEDURE — 99215 OFFICE O/P EST HI 40 MIN: CPT | Performed by: SURGERY

## 2025-02-27 PROCEDURE — 3008F BODY MASS INDEX DOCD: CPT | Performed by: SURGERY

## 2025-02-27 PROCEDURE — 1159F MED LIST DOCD IN RCRD: CPT | Performed by: SURGERY

## 2025-02-27 PROCEDURE — 1160F RVW MEDS BY RX/DR IN RCRD: CPT | Performed by: SURGERY

## 2025-02-27 PROCEDURE — 1125F AMNT PAIN NOTED PAIN PRSNT: CPT | Performed by: SURGERY

## 2025-02-27 PROCEDURE — 1123F ACP DISCUSS/DSCN MKR DOCD: CPT | Performed by: SURGERY

## 2025-02-27 ASSESSMENT — LIFESTYLE VARIABLES
SKIP TO QUESTIONS 9-10: 1
HOW OFTEN DO YOU HAVE A DRINK CONTAINING ALCOHOL: 2-4 TIMES A MONTH
HOW MANY STANDARD DRINKS CONTAINING ALCOHOL DO YOU HAVE ON A TYPICAL DAY: 1 OR 2
HOW OFTEN DO YOU HAVE SIX OR MORE DRINKS ON ONE OCCASION: NEVER
AUDIT-C TOTAL SCORE: 2

## 2025-02-27 ASSESSMENT — PATIENT HEALTH QUESTIONNAIRE - PHQ9
SUM OF ALL RESPONSES TO PHQ9 QUESTIONS 1 & 2: 0
1. LITTLE INTEREST OR PLEASURE IN DOING THINGS: NOT AT ALL
2. FEELING DOWN, DEPRESSED OR HOPELESS: NOT AT ALL

## 2025-02-27 ASSESSMENT — ENCOUNTER SYMPTOMS
DEPRESSION: 0
LOSS OF SENSATION IN FEET: 0
OCCASIONAL FEELINGS OF UNSTEADINESS: 0

## 2025-02-27 ASSESSMENT — PAIN SCALES - GENERAL: PAINLEVEL_OUTOF10: 3

## 2025-02-27 NOTE — ASSESSMENT & PLAN NOTE
patient with approximately 1.3 cm invasive ductal carcinoma grade 2-3 out of 3 ER/OK positive HER2/elizabeth negative.  Pathology was thoroughly reviewed with the patient.  Patient had an MRI bilateral breast and now has issues with lesion seen in the right breast.  We will coordinate for biopsy of those areas in the right breast and she will have 1 more follow-up visit before her surgery.  At this time we reviewed her surgical plan which would include Magseed localization lumpectomy left breast with sentinel node biopsy.  Risk-benefit alternative doing the surgery were thoroughly discussed with the patient agrees to proceed.  We did discuss the option of mastectomy but this was not recommended considering her tumor characteristics.     patient is unknown family history with her mother and that side of the family as a result recommending genetic testing.

## 2025-02-27 NOTE — ASSESSMENT & PLAN NOTE
Patient with 2 new masses seen in the right breast 1 in the upper outer quadrant 1 in the lower medial quadrant.  These require biopsy prior to proceeding with her left breast cancer surgery.  Risk of its alternatives of doing the biopsies were thoroughly discussed with the patient who agrees to proceed.  We will bring her back for 1 last visit prior to surgery after the biopsies are complete

## 2025-03-05 ENCOUNTER — APPOINTMENT (OUTPATIENT)
Dept: RADIOLOGY | Facility: CLINIC | Age: 70
End: 2025-03-05
Payer: MEDICARE

## 2025-03-06 ENCOUNTER — APPOINTMENT (OUTPATIENT)
Dept: SURGERY | Facility: CLINIC | Age: 70
End: 2025-03-06
Payer: MEDICARE

## 2025-03-10 ENCOUNTER — OFFICE VISIT (OUTPATIENT)
Dept: PRIMARY CARE | Facility: CLINIC | Age: 70
End: 2025-03-10
Payer: MEDICARE

## 2025-03-10 ENCOUNTER — HOSPITAL ENCOUNTER (OUTPATIENT)
Dept: RADIOLOGY | Facility: HOSPITAL | Age: 70
Discharge: HOME | End: 2025-03-10
Payer: MEDICARE

## 2025-03-10 VITALS
BODY MASS INDEX: 29.57 KG/M2 | WEIGHT: 184 LBS | HEART RATE: 75 BPM | SYSTOLIC BLOOD PRESSURE: 132 MMHG | OXYGEN SATURATION: 91 % | DIASTOLIC BLOOD PRESSURE: 72 MMHG | HEIGHT: 66 IN

## 2025-03-10 DIAGNOSIS — J06.9 ACUTE UPPER RESPIRATORY INFECTION, UNSPECIFIED: ICD-10-CM

## 2025-03-10 DIAGNOSIS — R09.89 CHEST CONGESTION: ICD-10-CM

## 2025-03-10 DIAGNOSIS — R05.1 ACUTE COUGH: Primary | ICD-10-CM

## 2025-03-10 PROCEDURE — 1123F ACP DISCUSS/DSCN MKR DOCD: CPT

## 2025-03-10 PROCEDURE — 1159F MED LIST DOCD IN RCRD: CPT

## 2025-03-10 PROCEDURE — 71046 X-RAY EXAM CHEST 2 VIEWS: CPT | Performed by: RADIOLOGY

## 2025-03-10 PROCEDURE — 3008F BODY MASS INDEX DOCD: CPT

## 2025-03-10 PROCEDURE — 1036F TOBACCO NON-USER: CPT

## 2025-03-10 PROCEDURE — 99213 OFFICE O/P EST LOW 20 MIN: CPT

## 2025-03-10 PROCEDURE — 71046 X-RAY EXAM CHEST 2 VIEWS: CPT

## 2025-03-10 RX ORDER — BENZONATATE 200 MG/1
200 CAPSULE ORAL 3 TIMES DAILY PRN
Qty: 42 CAPSULE | Refills: 0 | Status: SHIPPED | OUTPATIENT
Start: 2025-03-10 | End: 2025-04-09

## 2025-03-10 RX ORDER — ALBUTEROL SULFATE 90 UG/1
2 INHALANT RESPIRATORY (INHALATION) EVERY 4 HOURS PRN
Qty: 8.5 G | Refills: 5 | Status: SHIPPED | OUTPATIENT
Start: 2025-03-10 | End: 2026-03-10

## 2025-03-10 ASSESSMENT — ENCOUNTER SYMPTOMS
FEVER: 0
SINUS PAIN: 0
CHEST TIGHTNESS: 0
DYSURIA: 0
ABDOMINAL PAIN: 0
CHILLS: 0
DIARRHEA: 0
SORE THROAT: 0
NAUSEA: 0
CONSTIPATION: 0
SINUS PRESSURE: 0
SHORTNESS OF BREATH: 0

## 2025-03-10 ASSESSMENT — PATIENT HEALTH QUESTIONNAIRE - PHQ9
1. LITTLE INTEREST OR PLEASURE IN DOING THINGS: NOT AT ALL
2. FEELING DOWN, DEPRESSED OR HOPELESS: NOT AT ALL
SUM OF ALL RESPONSES TO PHQ9 QUESTIONS 1 AND 2: 0

## 2025-03-10 NOTE — PROGRESS NOTES
"Subjective   Patient ID: Patsy Menendez is a 69 y.o. female who presents for Sick Visit (Patsy is here today for chest discomfort, feels like fluid in chest when coughing x 2-3 weeks).    HPI   Pt in today with chest discomfort, She has had a cough for 2-3 weeks and feels when she coughs she feels fluid in the chest. She took sudafed but it did nothing. She has another breast biopsy coming up in the next couple weeks. Has no congestion or others symptoms of feeling ill. Does feel a little more fatigued at times. But usually when she rests she feels better. Flu A in office negative. Will do EKG    Review of Systems   Constitutional:  Negative for chills and fever.   HENT:  Negative for ear pain, sinus pressure, sinus pain and sore throat.    Respiratory:  Negative for chest tightness and shortness of breath.    Cardiovascular:  Negative for chest pain.   Gastrointestinal:  Negative for abdominal pain, constipation, diarrhea and nausea.   Genitourinary:  Negative for dysuria.   Skin:  Negative for rash.   Neurological:  Negative for syncope.       Objective   /72   Pulse 75   Ht 1.676 m (5' 6\")   Wt 83.5 kg (184 lb)   SpO2 91%   BMI 29.70 kg/m²     Physical Exam  Vitals reviewed.   Constitutional:       Appearance: Normal appearance.   Cardiovascular:      Rate and Rhythm: Normal rate and regular rhythm.      Pulses: Normal pulses.      Heart sounds: Normal heart sounds.   Pulmonary:      Effort: Pulmonary effort is normal.      Breath sounds: Normal breath sounds.   Neurological:      General: No focal deficit present.      Mental Status: She is alert and oriented to person, place, and time.   Psychiatric:         Mood and Affect: Mood normal.         Behavior: Behavior normal.         Assessment/Plan   Diagnoses and all orders for this visit:referrral  Acute cough  -     benzonatate (Tessalon) 200 mg capsule; Take 1 capsule (200 mg) by mouth 3 times a day as needed for cough. Do not crush or " chew.  Chest congestion  -     Sars-CoV-2, Influenza A/B and RSV PCR  -     albuterol (ProAir HFA) 90 mcg/actuation inhaler; Inhale 2 puffs every 4 hours if needed for wheezing or shortness of breath.  -     XR chest 2 views; Future  Acute upper respiratory infection, unspecified  -     Sars-CoV-2, Influenza A/B and RSV PCR

## 2025-03-10 NOTE — PATIENT INSTRUCTIONS
#ACUTE BRONCHITIS  Tessalon Perles  Bronchitis is viral in nature and does not require antibiotics. Sx can last up to 6 weeks, particularly the cough  Drink extra fluids  Red flags: fever, shortness of breath, wheezing, or worsening symptoms, you should to to ED  Follow up if needed

## 2025-03-11 LAB
FLUAV RNA RESP QL NAA+PROBE: NOT DETECTED
FLUBV RNA RESP QL NAA+PROBE: NOT DETECTED
RSV RNA RESP QL NAA+PROBE: NOT DETECTED
SARS-COV-2 RNA RESP QL NAA+PROBE: NOT DETECTED

## 2025-03-17 ENCOUNTER — HOSPITAL ENCOUNTER (OUTPATIENT)
Dept: RADIOLOGY | Facility: CLINIC | Age: 70
Discharge: HOME | End: 2025-03-17
Payer: MEDICARE

## 2025-03-17 ENCOUNTER — APPOINTMENT (OUTPATIENT)
Dept: RADIOLOGY | Facility: CLINIC | Age: 70
End: 2025-03-17
Payer: MEDICARE

## 2025-03-17 DIAGNOSIS — C50.912 INFILTRATING DUCTAL CARCINOMA OF LEFT BREAST: ICD-10-CM

## 2025-03-17 DIAGNOSIS — R92.8 OTHER ABNORMAL AND INCONCLUSIVE FINDINGS ON DIAGNOSTIC IMAGING OF BREAST: ICD-10-CM

## 2025-03-17 DIAGNOSIS — N63.10 BREAST MASS, RIGHT: ICD-10-CM

## 2025-03-17 DIAGNOSIS — N63.11 MASS OF UPPER OUTER QUADRANT OF RIGHT BREAST: Primary | ICD-10-CM

## 2025-03-17 DIAGNOSIS — N63.11 MASS OF UPPER OUTER QUADRANT OF RIGHT BREAST: ICD-10-CM

## 2025-03-17 PROCEDURE — 2500000004 HC RX 250 GENERAL PHARMACY W/ HCPCS (ALT 636 FOR OP/ED): Performed by: RADIOLOGY

## 2025-03-17 PROCEDURE — 19081 BX BREAST 1ST LESION STRTCTC: CPT | Mod: RT

## 2025-03-17 PROCEDURE — C1739 HC OR 272 NO HCPCS: HCPCS

## 2025-03-17 PROCEDURE — 76982 USE 1ST TARGET LESION: CPT | Mod: RT

## 2025-03-17 PROCEDURE — 2780000003 HC OR 278 NO HCPCS

## 2025-03-17 PROCEDURE — 19083 BX BREAST 1ST LESION US IMAG: CPT | Mod: RT

## 2025-03-17 PROCEDURE — 76642 ULTRASOUND BREAST LIMITED: CPT | Mod: RT

## 2025-03-17 PROCEDURE — 77065 DX MAMMO INCL CAD UNI: CPT | Mod: RIGHT SIDE | Performed by: RADIOLOGY

## 2025-03-17 PROCEDURE — 19083 BX BREAST 1ST LESION US IMAG: CPT | Mod: RIGHT SIDE | Performed by: RADIOLOGY

## 2025-03-17 PROCEDURE — 2720000007 HC OR 272 NO HCPCS

## 2025-03-17 PROCEDURE — 19081 BX BREAST 1ST LESION STRTCTC: CPT | Mod: RIGHT SIDE | Performed by: RADIOLOGY

## 2025-03-17 PROCEDURE — C1819 TISSUE LOCALIZATION-EXCISION: HCPCS

## 2025-03-17 PROCEDURE — C1739 HC OR 278 NO HCPCS: HCPCS

## 2025-03-17 PROCEDURE — 76642 ULTRASOUND BREAST LIMITED: CPT | Mod: RIGHT SIDE | Performed by: RADIOLOGY

## 2025-03-17 RX ADMIN — Medication 20 ML: at 10:38

## 2025-03-17 RX ADMIN — Medication 10 ML: at 08:55

## 2025-03-17 ASSESSMENT — PAIN - FUNCTIONAL ASSESSMENT: PAIN_FUNCTIONAL_ASSESSMENT: 0-10

## 2025-03-17 ASSESSMENT — PAIN SCALES - GENERAL
PAINLEVEL_OUTOF10: 3
PAINLEVEL_OUTOF10: 6
PAINLEVEL_OUTOF10: 0 - NO PAIN

## 2025-03-17 NOTE — Clinical Note
Dr Ramachandran confers with patient and discusses the need for a stereotactic biopsy on the right side in lieu of a MRI biopsy (that was scheduled). Dr Martino's office called and order placed.  Patient verbalizes an understanding of the procedure.

## 2025-03-17 NOTE — DISCHARGE INSTRUCTIONS
AFTER THE TEST  A steri-strip and bandage will be placed over the incision. You may shower after 24 hours. Remove bandage after 24 hours. Remove bandage after the shower. Leave the steri-strips in place to fall off on their own. If after 1 week the steri-strips are still on, you may remove them. Avoid swimming or soaking in tub for 3 days.     You may have mild discomfort at the test site. If needed, you may take Tylenol (Acetaminophen) for pain. Please avoid taking NSAIDs, Motrin, Advil, Aleve, or ibuprofen for 24 hours following the biopsy. After 24 hours you may resume NSAIDSs.     If you take aspirin, Plavix, Coumadin, Xarelto or Eliquis please tell us. If these medications were stopped by your provider, please ask them when to resume.     You may have some tenderness, bruising or slight bleeding at the site. Please apply ice packs to the site for 15 minutes on and 15 minutes off for a 2 hour minimum.     Most people can return to their usual routine after the procedure. Avoid Strenuous activity for 24 hours.     Sleep in a bra the night after your biopsy. Continue to do so for comfort.     Call your provider if you have any of the following symptoms :  Fever  Increased pain  Increased bleeding  Redness  Increased swelling  Yellowish drainage  Your provider will get the biopsy results within 5 - 7 days. Call your provider with any questions.     Patient education brochure and pain/comfort measures have been reviewed.   Phone number provided to contact [facility] if problems arise.     Patient verbalized understanding of home going instructions.  Patient understands that the stereotactic breast biopsy was done in lieu of an MRI biopsy.   Patient left at 1115.

## 2025-03-17 NOTE — Clinical Note
Patient offered aromatherapy, warm blankets and music. Guided imagery, touch and relaxation breathing to be used throughout the procedure.   Procedural steps explained and patient given opportunity to verbalize concerns and seek clarification.  Post procedure self-care and potential for bruising , hematoma, and pain reviewed.  Patient verbalizes understanding.

## 2025-03-17 NOTE — Clinical Note
Done. Pressure held x 10 minutes, incision dry, steri strips intact and compression dressing applied. Ice pack applied.

## 2025-03-17 NOTE — Clinical Note
Pressure held x 10 minutes, incision dry, steri strips intact and compression dressing applied. Ice pack applied.

## 2025-03-18 ENCOUNTER — TELEPHONE (OUTPATIENT)
Dept: RADIOLOGY | Facility: CLINIC | Age: 70
End: 2025-03-18
Payer: MEDICARE

## 2025-03-20 ENCOUNTER — TELEPHONE (OUTPATIENT)
Dept: SURGERY | Facility: CLINIC | Age: 70
End: 2025-03-20
Payer: MEDICARE

## 2025-03-20 LAB
LABORATORY COMMENT REPORT: NORMAL
LABORATORY COMMENT REPORT: NORMAL
PATH REPORT.COMMENTS IMP SPEC: NORMAL
PATH REPORT.COMMENTS IMP SPEC: NORMAL
PATH REPORT.FINAL DX SPEC: NORMAL
PATH REPORT.FINAL DX SPEC: NORMAL
PATH REPORT.GROSS SPEC: NORMAL
PATH REPORT.GROSS SPEC: NORMAL
PATH REPORT.RELEVANT HX SPEC: NORMAL
PATH REPORT.RELEVANT HX SPEC: NORMAL
PATH REPORT.TOTAL CANCER: NORMAL
PATH REPORT.TOTAL CANCER: NORMAL
RESIDENT REVIEW: NORMAL
RESIDENT REVIEW: NORMAL

## 2025-03-20 NOTE — TELEPHONE ENCOUNTER
Bonnie Martino MD  [facility] [id number] [place] [id number] Clinical Support Staff  Call patient and let her know no cancer in the right breast.  We will proceed with surgery as scheduled.  Patient should have 1 more visit prior to surgery for final discussion

## 2025-03-20 NOTE — TELEPHONE ENCOUNTER
Called patient today to let her now what DR. Martino stated below. Patient verbalized understanding. Patient is scheduled to see Dr. Martino  in the office on 4/4/2025 for follow up prior to surgery. Email was also sent out today for patients magseed placement. We are also still waiting to hear back from OR  for surgery date.    Viridiana Louise CMA

## 2025-03-24 PROBLEM — L98.9 CHANGING SKIN LESION: Status: ACTIVE | Noted: 2018-03-14

## 2025-03-24 PROBLEM — M54.30 SCIATICA: Status: ACTIVE | Noted: 2025-03-24

## 2025-03-24 PROBLEM — S02.2XXA FRACTURE OF NASAL BONES: Status: ACTIVE | Noted: 2025-03-24

## 2025-03-24 PROBLEM — T88.4XXA DIFFICULT AIRWAY FOR INTUBATION: Status: ACTIVE | Noted: 2018-11-14

## 2025-03-24 PROBLEM — S09.90XA INJURY OF HEAD: Status: ACTIVE | Noted: 2025-03-24

## 2025-03-24 PROBLEM — J20.9 ACUTE BRONCHITIS: Status: ACTIVE | Noted: 2025-03-24

## 2025-03-24 PROBLEM — D23.9 INTRADERMAL NEVUS: Status: ACTIVE | Noted: 2018-06-06

## 2025-03-24 PROBLEM — D16.4 OSTEOMA OF SKULL: Status: ACTIVE | Noted: 2017-12-30

## 2025-03-24 RX ORDER — METHYLPREDNISOLONE 4 MG/1
TABLET ORAL
COMMUNITY
Start: 2024-12-26

## 2025-03-24 RX ORDER — AMOXICILLIN 500 MG/1
CAPSULE ORAL
COMMUNITY
Start: 2024-12-03

## 2025-03-24 NOTE — PROGRESS NOTES
"Subjective   Patient ID: Patsy Menendez is a 70 y.o. female who presents for discuss right stereotatic brest biopsy results, bx on 3/17 .  HPI  Patient returns to clinic for follow up to discuss right stereotatic breast biopsy results, bx on 3/17.   Patient last seen in the office on 2/27/2025 for discussion of breast biopsy results.   Surgical pathology was collected and finalized.  Patient was seen by her cardiologist who also wants to do an intervention.  We will look into his note to ensure we are okay to proceed with surgery.  Patient seen today with her daughter on phone    FINAL DIAGNOSIS   A. Right breast mass, 9:00, 3 cm from the nipple, core biopsy:  -- Breast tissue with dense fibrotic stroma and usual ductal hyperplasia (see comment)   -- Microcalcifications associated with benign ducts     B. Right breast mass, core biopsy:  -- Usual ductal hyperplasia (see comment)   -- Apocrine cyst  -- Dilated ducts     Ofelia Velasquez MD, PhD       Electronically signed by Ofelia Velasquez MD PhD on 3/20/2025 at 1326        By the signature on this report, the individual or group listed as making the Final Interpretation/Diagnosis certifies that they have reviewed this case.    Comment    Slides (A1, B1 and B2) were reviewed at the [facility] Breast Consensus Conference via Zoom on 3/20/2025 with Ashley Coon, WILL Contreras and MARILYN Menjivar in attendance, who agreed with the diagnosis.      Resident Review    The gross and/or microscopic findings were reviewed in conjunction with pathology resident, Rachel Sandoval MD.      Clinical History    70-year-old woman with left breast invasive ductal carcinoma recently diagnosed on biopsy ([id number]), now presenting with 2 masses in the right breast.      Gross Description    A: Received in formalin, labeled with the patient´s name and hospital number and \" right breast 9:00, 3 cm from nipple\", are multiple irregular/cylindrical segments of yellow-white fatty soft tissue aggregating to 0.9 x " "0.4 x 0.2 cm.The specimen is submitted in toto in 1 cassette.  Health system     NOTE:    Collected time: 0850 3/17/2025.  This specimen was placed into formalin at: 0850 3/17/2025.  B: Received in formalin, labeled with the patient´s name and hospital number and \" right breast mass\", are multiple irregular/cylindrical segments of yellow-white fatty soft tissue aggregating to 2.9 x 1.1 x 0.3 cm.The specimen is submitted in toto in 2 cassettes.  Health system     FINAL DIAGNOSIS   A. Right breast mass, 9:00, 3 cm from the nipple, core biopsy:  -- Breast tissue with dense fibrotic stroma and usual ductal hyperplasia (see comment)   -- Microcalcifications associated with benign ducts     B. Right breast mass, core biopsy:  -- Usual ductal hyperplasia (see comment)   -- Apocrine cyst  -- Dilated ducts     Ofelia Velasquez MD, PhD       Electronically signed by Ofelia Velasquez MD PhD on 3/20/2025 at 1326        By the signature on this report, the individual or group listed as making the Final Interpretation/Diagnosis certifies that they have reviewed this case.    Comment    Slides (A1, B1 and B2) were reviewed at the [facility] Breast Consensus Conference via Zoom on 3/20/2025 with Ashley Coon, WILL Contreras and MARILYN Menjivar in attendance, who agreed with the diagnosis.      Resident Review    The gross and/or microscopic findings were reviewed in conjunction with pathology resident, Rachel Sandoval MD.      Clinical History    70-year-old woman with left breast invasive ductal carcinoma recently diagnosed on biopsy ([id number]), now presenting with 2 masses in the right breast.      Gross Description    A: Received in formalin, labeled with the patient´s name and hospital number and \" right breast 9:00, 3 cm from nipple\", are multiple irregular/cylindrical segments of yellow-white fatty soft tissue aggregating to 0.9 x 0.4 x 0.2 cm.The specimen is submitted in toto in 1 cassette.  Health system     NOTE:    Collected time: 0850 3/17/2025.  This specimen was placed into " "formalin at: 0850 3/17/2025.  B: Received in formalin, labeled with the patient´s name and hospital number and \" right breast mass\", are multiple irregular/cylindrical segments of yellow-white fatty soft tissue aggregating to 2.9 x 1.1 x 0.3 cm.The specimen is submitted in toto in 2 cassettes.       Social History:  Tobacco Use - Former  Do you use any recreational drugs - No  Alcohol Use - Yes   Caffeine Intake - yes  Working - Retired   Marital status -   Living with -  Spouse    Past Medical History:   Diagnosis Date    Carpal tunnel syndrome       Past Surgical History:   Procedure Laterality Date    APPENDECTOMY      BREAST BIOPSY Left 2025    BREAST SURGERY Bilateral     bilateral breast cyst surgery for fibroids    CARPAL TUNNEL RELEASE       SECTION, LOW TRANSVERSE      x3 in the 70s    CHOLECYSTECTOMY      laparoscopic    HYSTERECTOMY      bilateral tubes removed    LAPAROTOMY OOPHERECTOMY Bilateral     MANDIBLE SURGERY      TMJ implant      Allergies   Allergen Reactions    Phenylephrine Shortness of breath     Other reaction(s): Difficulty Breathing    Pseudoephedrine Hcl Shortness of breath and Unknown    Chicken Derived Swelling     KFC    Loratadine Unknown    Morphine Nausea/vomiting and Unknown     Other reaction(s): Vomiting      Family History   Problem Relation Name Age of Onset    Cancer Brother      Brain cancer Mother's Sister      Stroke Maternal Grandmother          Review of Systems  /75 (BP Location: Left arm, Patient Position: Sitting, BP Cuff Size: Adult)   Pulse 67   Temp 36.5 °C (97.7 °F) (Temporal)   Resp 17   Ht 1.676 m (5' 6\")   Wt 83.5 kg (184 lb)   SpO2 99%   BMI 29.70 kg/m²     Objective   Physical Exam  Patient with minimal bruising and hematoma right breast.  Assessment/Plan   Problem List Items Addressed This Visit             ICD-10-CM    Mass of upper outer quadrant of left breast N63.21     Patient with 1.3 cm lesion, IDC, grade " 2 out of 3.  ER/AR positive HER2/elizabeth negative tumor.  Left breast 12 o'clock position approximately 5 cm D FN.  Long discussion was held with the patient and her daughter concerning how her surgical day will proceed.  Patient is undergoing a Magseed localization followed by lumpectomy and sentinel node biopsy.  Risk-benefit alternatives of doing the surgery were thoroughly discussed with the patient and her daughter who agree to proceed.    Patient also has concerns about potential nasal fracture.  The trauma was over 5 years ago.  Recently however the effects were found on imaging.  I informed her we do not do anything intranasal for the surgery.  We will alert anesthesia to her issue.         Mass of upper outer quadrant of right breast - Primary N63.11     Patient status post ultrasound-guided biopsy as well as a stereotactic biopsy of the right breast of 2 abnormalities that were benign.  There is concordance.             Breast History:     Patient is new to clinic and presents for eft US breast biopsy consult Bx 1/30 .  Patient is referred by Celina OKEEFE.  Patient seen Celina OKEEFE in office on 11/20/2024 for annual exam. Order was for annual BI mammogram screening.  Patient had BI mammogram Bilateral screening tomosynthesis completed on 11/22/2024. Impression was Left breast mass with calcification. Additional mammographic and sonographic imaging recommended.No evidence of malignancy right breast. Due to results additional orders were placed to be done.  Patient had BI mammogram left diagnostic tomosynthesis completed  on 12/20/2024. Impression was Highly suspicious mammographic mass for which ultrasound evaluation is still recommended.. The patient reported to the technologist she would return for the sonographic evaluation. Due to result additional testing ordered.  Patient had BI US breast limited left completed on 1/13/2025. Impression was At the 12 o'clock position left breast  with a distance from nipple  of 5 cm is a spiculated mass demonstrating marked acoustic shadowing  and increased stiffness on elastography, highly suspicious for malignancy. Biopsy is recommended. Need for biopsy was discussed with the patient at the time of the exam. The 2 o'clock position there is a focal area of oval  fibroglandular appearing tissue likely representing asymmetric breast  tissue based on this finding and previous mammographic findings.  With a 1.3 cm mass left breast 12:00 approximately 3 cm D FN suspicious for adenocarcinoma.  Recommend ultrasound-guided core biopsy. Patient to be scheduled for ultrasound guided  biopsy of the breast in the radiology department. Risk, benefits and alternatives to this plan were thoroughly discussed with the patient who agrees to proceed.  The procedure was also thoroughly discussed as well as her follow-up. She is to see me in the office in one week but I also will call as soon as I see the pathology which is usually 4 working days from procedure.   Patient with a 2.1 cm asymmetry left breast 2 o'clock position 5 cm D FN that appears to be unchanged from previous imaging. If the 12:00 lesion turns out to be a cancer patient is a good candidate for preoperative MRI   Patient returns to clinic and presents for discussion of breast biopsy results.  Patient was last seen in clinic on 1/27/2025 for left US biopsy consult , bx on 1/30.  Surgical pathology was collected and finalized.    patient with approximately 1.3 cm invasive ductal carcinoma grade 2-3 out of 3 ER/IA positive HER2/elizabeth negative.  Pathology was thoroughly reviewed with the patient.  Patient had an MRI bilateral breast and now has issues with lesion seen in the right breast.  We will coordinate for biopsy of those areas in the right breast and she will have 1 more follow-up visit before her surgery.  At this time we reviewed her surgical plan which would include Magseed localization lumpectomy left  breast with sentinel node biopsy.  Risk-benefit alternative doing the surgery were thoroughly discussed with the patient agrees to proceed.  We did discuss the option of mastectomy but this was not recommended considering her tumor characteristics.      patient is unknown family history with her mother and that side of the family as a result recommending genetic testing.  Patient with 2 new masses seen in the right breast 1 in the upper outer quadrant 1 in the lower medial quadrant. These require biopsy prior to proceeding with her left breast cancer surgery. Risk of its alternatives of doing the biopsies were thoroughly discussed with the patient who agrees to proceed. We will bring her back for 1 last visit prior to surgery after the biopsies are complete    Previous Biopsy: No Menarche Age: 12 Age of first delivery: 16 Breastfeed: Tried to Menopause age: 42 HRT: No Family history of breast cancer: no Family history of ovarian cancer: no Family history of pancreatic cancer: no G 4 P 3     Bonnie Martino MD 04/04/25 12:23 PM

## 2025-04-04 ENCOUNTER — OFFICE VISIT (OUTPATIENT)
Dept: SURGERY | Facility: CLINIC | Age: 70
End: 2025-04-04
Payer: MEDICARE

## 2025-04-04 VITALS
HEIGHT: 66 IN | RESPIRATION RATE: 17 BRPM | WEIGHT: 184 LBS | HEART RATE: 67 BPM | TEMPERATURE: 97.7 F | BODY MASS INDEX: 29.57 KG/M2 | OXYGEN SATURATION: 99 % | SYSTOLIC BLOOD PRESSURE: 131 MMHG | DIASTOLIC BLOOD PRESSURE: 75 MMHG

## 2025-04-04 DIAGNOSIS — N63.11 MASS OF UPPER OUTER QUADRANT OF RIGHT BREAST: Primary | ICD-10-CM

## 2025-04-04 DIAGNOSIS — N63.21 MASS OF UPPER OUTER QUADRANT OF LEFT BREAST: ICD-10-CM

## 2025-04-04 PROCEDURE — 1123F ACP DISCUSS/DSCN MKR DOCD: CPT | Performed by: SURGERY

## 2025-04-04 PROCEDURE — 99214 OFFICE O/P EST MOD 30 MIN: CPT | Performed by: SURGERY

## 2025-04-04 PROCEDURE — 3008F BODY MASS INDEX DOCD: CPT | Performed by: SURGERY

## 2025-04-04 PROCEDURE — 1159F MED LIST DOCD IN RCRD: CPT | Performed by: SURGERY

## 2025-04-04 PROCEDURE — 1125F AMNT PAIN NOTED PAIN PRSNT: CPT | Performed by: SURGERY

## 2025-04-04 PROCEDURE — 1036F TOBACCO NON-USER: CPT | Performed by: SURGERY

## 2025-04-04 RX ORDER — ASPIRIN 81 MG/1
81 TABLET ORAL DAILY
COMMUNITY

## 2025-04-04 ASSESSMENT — ENCOUNTER SYMPTOMS
LOSS OF SENSATION IN FEET: 0
DEPRESSION: 0
OCCASIONAL FEELINGS OF UNSTEADINESS: 0

## 2025-04-04 ASSESSMENT — LIFESTYLE VARIABLES
AUDIT-C TOTAL SCORE: 2
HOW OFTEN DO YOU HAVE A DRINK CONTAINING ALCOHOL: 2-4 TIMES A MONTH
HOW MANY STANDARD DRINKS CONTAINING ALCOHOL DO YOU HAVE ON A TYPICAL DAY: 1 OR 2
SKIP TO QUESTIONS 9-10: 1
HOW OFTEN DO YOU HAVE SIX OR MORE DRINKS ON ONE OCCASION: NEVER

## 2025-04-04 ASSESSMENT — PAIN SCALES - GENERAL: PAINLEVEL_OUTOF10: 3

## 2025-04-04 NOTE — ASSESSMENT & PLAN NOTE
Patient status post ultrasound-guided biopsy as well as a stereotactic biopsy of the right breast of 2 abnormalities that were benign.  There is concordance.

## 2025-04-09 ENCOUNTER — HOSPITAL ENCOUNTER (OUTPATIENT)
Dept: RADIOLOGY | Facility: HOSPITAL | Age: 70
Discharge: HOME | End: 2025-04-09
Payer: MEDICARE

## 2025-04-09 DIAGNOSIS — N63.20 LEFT BREAST MASS: ICD-10-CM

## 2025-04-09 DIAGNOSIS — N63.11 MASS OF UPPER OUTER QUADRANT OF RIGHT BREAST: ICD-10-CM

## 2025-04-09 PROCEDURE — 19285 PERQ DEV BREAST 1ST US IMAG: CPT | Mod: LT

## 2025-04-09 PROCEDURE — 2780000003 HC OR 278 NO HCPCS

## 2025-04-09 PROCEDURE — C1739 HC OR 278 NO HCPCS: HCPCS

## 2025-04-09 PROCEDURE — 2500000004 HC RX 250 GENERAL PHARMACY W/ HCPCS (ALT 636 FOR OP/ED): Performed by: STUDENT IN AN ORGANIZED HEALTH CARE EDUCATION/TRAINING PROGRAM

## 2025-04-09 RX ADMIN — Medication 4 ML: at 11:01

## 2025-04-09 ASSESSMENT — PAIN SCALES - GENERAL: PAINLEVEL_OUTOF10: 2

## 2025-04-14 PROBLEM — Z12.31 ENCOUNTER FOR SCREENING MAMMOGRAM FOR BREAST CANCER: Status: ACTIVE | Noted: 2024-11-20

## 2025-04-25 ENCOUNTER — PRE-ADMISSION TESTING (OUTPATIENT)
Dept: PREADMISSION TESTING | Facility: HOSPITAL | Age: 70
End: 2025-04-25
Payer: MEDICARE

## 2025-04-25 ENCOUNTER — HOSPITAL ENCOUNTER (OUTPATIENT)
Dept: RADIOLOGY | Facility: HOSPITAL | Age: 70
Discharge: HOME | End: 2025-04-25
Payer: MEDICARE

## 2025-04-25 VITALS
DIASTOLIC BLOOD PRESSURE: 68 MMHG | WEIGHT: 187 LBS | OXYGEN SATURATION: 97 % | TEMPERATURE: 97.2 F | RESPIRATION RATE: 16 BRPM | SYSTOLIC BLOOD PRESSURE: 130 MMHG | BODY MASS INDEX: 30.05 KG/M2 | HEART RATE: 95 BPM | HEIGHT: 66 IN

## 2025-04-25 DIAGNOSIS — R05.3 COUGH, PERSISTENT: ICD-10-CM

## 2025-04-25 DIAGNOSIS — M79.605 LEFT LEG PAIN: Primary | ICD-10-CM

## 2025-04-25 DIAGNOSIS — Z01.818 ENCOUNTER FOR PREADMISSION TESTING: ICD-10-CM

## 2025-04-25 LAB
ALBUMIN SERPL BCP-MCNC: 4.2 G/DL (ref 3.4–5)
ALP SERPL-CCNC: 92 U/L (ref 33–136)
ALT SERPL W P-5'-P-CCNC: 13 U/L (ref 7–45)
ANION GAP SERPL CALCULATED.3IONS-SCNC: 12 MMOL/L (ref 10–20)
AST SERPL W P-5'-P-CCNC: 12 U/L (ref 9–39)
BASOPHILS # BLD AUTO: 0.09 X10*3/UL (ref 0–0.1)
BASOPHILS NFR BLD AUTO: 1 %
BILIRUB SERPL-MCNC: 0.8 MG/DL (ref 0–1.2)
BUN SERPL-MCNC: 20 MG/DL (ref 6–23)
CALCIUM SERPL-MCNC: 8.9 MG/DL (ref 8.6–10.3)
CHLORIDE SERPL-SCNC: 104 MMOL/L (ref 98–107)
CO2 SERPL-SCNC: 27 MMOL/L (ref 21–32)
CREAT SERPL-MCNC: 0.88 MG/DL (ref 0.5–1.05)
EGFRCR SERPLBLD CKD-EPI 2021: 70 ML/MIN/1.73M*2
EOSINOPHIL # BLD AUTO: 0.35 X10*3/UL (ref 0–0.4)
EOSINOPHIL NFR BLD AUTO: 4 %
ERYTHROCYTE [DISTWIDTH] IN BLOOD BY AUTOMATED COUNT: 13.7 % (ref 11.5–14.5)
FLUAV RNA RESP QL NAA+PROBE: NOT DETECTED
FLUBV RNA RESP QL NAA+PROBE: NOT DETECTED
GLUCOSE SERPL-MCNC: 96 MG/DL (ref 74–99)
HCT VFR BLD AUTO: 44.6 % (ref 36–46)
HGB BLD-MCNC: 14.9 G/DL (ref 12–16)
IMM GRANULOCYTES # BLD AUTO: 0.06 X10*3/UL (ref 0–0.5)
IMM GRANULOCYTES NFR BLD AUTO: 0.7 % (ref 0–0.9)
LYMPHOCYTES # BLD AUTO: 1.79 X10*3/UL (ref 0.8–3)
LYMPHOCYTES NFR BLD AUTO: 20.7 %
MCH RBC QN AUTO: 28.1 PG (ref 26–34)
MCHC RBC AUTO-ENTMCNC: 33.4 G/DL (ref 32–36)
MCV RBC AUTO: 84 FL (ref 80–100)
MONOCYTES # BLD AUTO: 0.97 X10*3/UL (ref 0.05–0.8)
MONOCYTES NFR BLD AUTO: 11.2 %
NEUTROPHILS # BLD AUTO: 5.39 X10*3/UL (ref 1.6–5.5)
NEUTROPHILS NFR BLD AUTO: 62.4 %
NRBC BLD-RTO: 0 /100 WBCS (ref 0–0)
PLATELET # BLD AUTO: 274 X10*3/UL (ref 150–450)
POTASSIUM SERPL-SCNC: 4.3 MMOL/L (ref 3.5–5.3)
PROT SERPL-MCNC: 5.9 G/DL (ref 6.4–8.2)
RBC # BLD AUTO: 5.31 X10*6/UL (ref 4–5.2)
SARS-COV-2 RNA RESP QL NAA+PROBE: NOT DETECTED
SODIUM SERPL-SCNC: 139 MMOL/L (ref 136–145)
WBC # BLD AUTO: 8.7 X10*3/UL (ref 4.4–11.3)

## 2025-04-25 PROCEDURE — 99204 OFFICE O/P NEW MOD 45 MIN: CPT | Performed by: NURSE PRACTITIONER

## 2025-04-25 PROCEDURE — 85025 COMPLETE CBC W/AUTO DIFF WBC: CPT

## 2025-04-25 PROCEDURE — 84075 ASSAY ALKALINE PHOSPHATASE: CPT

## 2025-04-25 PROCEDURE — 36415 COLL VENOUS BLD VENIPUNCTURE: CPT

## 2025-04-25 PROCEDURE — 93971 EXTREMITY STUDY: CPT

## 2025-04-25 PROCEDURE — 87636 SARSCOV2 & INF A&B AMP PRB: CPT

## 2025-04-25 RX ORDER — GUAIFENESIN AND PSEUDOEPHEDRINE HCL 600; 60 MG/1; MG/1
1 TABLET, EXTENDED RELEASE ORAL 2 TIMES DAILY PRN
Status: ON HOLD | COMMUNITY
End: 2025-04-29

## 2025-04-25 ASSESSMENT — DUKE ACTIVITY SCORE INDEX (DASI)
CAN YOU CLIMB A FLIGHT OF STAIRS OR WALK UP A HILL: YES
CAN YOU TAKE CARE OF YOURSELF (EAT, DRESS, BATHE, OR USE TOILET): YES
CAN YOU DO HEAVY WORK AROUND THE HOUSE LIKE SCRUBBING FLOORS OR LIFTING AND MOVING HEAVY FURNITURE: YES
CAN YOU PARTICIPATE IN STRENOUS SPORTS LIKE SWIMMING, SINGLES TENNIS, FOOTBALL, BASKETBALL, OR SKIING: NO
CAN YOU DO LIGHT WORK AROUND THE HOUSE LIKE DUSTING OR WASHING DISHES: YES
CAN YOU WALK A BLOCK OR TWO ON LEVEL GROUND: YES
CAN YOU DO MODERATE WORK AROUND THE HOUSE LIKE VACUUMING, SWEEPING FLOORS OR CARRYING GROCERIES: YES
CAN YOU DO YARD WORK LIKE RAKING LEAVES, WEEDING OR PUSHING A MOWER: YES
CAN YOU WALK INDOORS, SUCH AS AROUND YOUR HOUSE: YES
DASI METS SCORE: 7.3
CAN YOU HAVE SEXUAL RELATIONS: YES
CAN YOU RUN A SHORT DISTANCE: NO
TOTAL_SCORE: 36.7
CAN YOU PARTICIPATE IN MODERATE RECREATIONAL ACTIVITIES LIKE GOLF, BOWLING, DANCING, DOUBLES TENNIS OR THROWING A BASEBALL OR FOOTBALL: NO

## 2025-04-25 ASSESSMENT — ENCOUNTER SYMPTOMS
EYES NEGATIVE: 1
ENDOCRINE NEGATIVE: 1
COUGH: 1
GASTROINTESTINAL NEGATIVE: 1
CONSTITUTIONAL NEGATIVE: 1
NEUROLOGICAL NEGATIVE: 1
MUSCULOSKELETAL NEGATIVE: 1
CARDIOVASCULAR NEGATIVE: 1
NECK NEGATIVE: 1

## 2025-04-25 NOTE — H&P (VIEW-ONLY)
CPM/PAT Evaluation       Name: Patsy Menendez (Patsy Menendez)  /Age: 1955/70 y.o.     In-Person       Chief Complaint: magseed localization,l umpectomy left breast/sentinel node biopsy    This is a 71 y/o female with PMHX significant for carpal tunnel syndrome, chest pain, abnormal stress test, and invasive ductal carcinoma that follows with Dr. Martino.  Patient with approximately 1.3 cm invasive ductal carcinoma grade 2-3 out of 3 ER/AZ positive HER2/elizabeth negative. Per Dr. Martino notes: Patient with 2 new masses seen in the right breast 1 in the upper outer quadrant 1 in the lower medial quadrant-requiring biopsy prior to proceeding with her left breast cancer surgery.  Plan is for patient to have 1 last visit prior to surgery after the biopsies are complete. Patient is planned for Magseed localization lumpectomy left breast with sentinel node biopsy on 25 with Dr. Martino.    Patient denied fever, chest pain, shortness of breath, abdominal pain, nausea, vomiting, diarrhea, or urinary symptoms. Patient did endorse intermittent chills over last few days, but no fevers.  Patient denied history of CVA, CAD, DM2, PE/DVT or TERESITA.        Medical History[1]    Surgical History[2]    Patient  has no history on file for sexual activity.    Family History[3]  Social History     Tobacco Use    Smoking status: Former     Current packs/day: 0.00     Types: Cigarettes     Quit date: 1969     Years since quittin.3     Passive exposure: Past    Smokeless tobacco: Never   Substance Use Topics    Alcohol use: Yes     Comment: occ        Allergies[4]    Current Medications[5]     PAT ROS:   Constitutional:   neg    Neuro/Psych:   neg    Eyes:   neg    Ears:   neg    Nose:   neg    Mouth:   neg    Throat:   neg    Neck:   neg    Cardio:   neg    Respiratory:    cough  Endocrine:   neg    GI:   neg    :   neg    Musculoskeletal:   neg    Hematologic:   neg    Skin:  neg        Physical Exam  Vitals  reviewed.   Constitutional:       Appearance: Normal appearance. She is obese.   HENT:      Head: Normocephalic and atraumatic.      Right Ear: External ear normal.      Left Ear: External ear normal.      Nose: Nose normal.      Mouth/Throat:      Mouth: Mucous membranes are moist.      Pharynx: Oropharynx is clear.   Eyes:      Conjunctiva/sclera: Conjunctivae normal.      Pupils: Pupils are equal, round, and reactive to light.   Cardiovascular:      Rate and Rhythm: Normal rate and regular rhythm.      Pulses: Normal pulses.      Heart sounds: Normal heart sounds.   Pulmonary:      Effort: Pulmonary effort is normal.      Breath sounds: Normal breath sounds.   Abdominal:      General: Bowel sounds are normal.      Palpations: Abdomen is soft.   Musculoskeletal:         General: Normal range of motion.      Cervical back: Normal range of motion and neck supple.   Skin:     General: Skin is warm and dry.      Capillary Refill: Capillary refill takes less than 2 seconds.   Neurological:      General: No focal deficit present.      Mental Status: She is alert and oriented to person, place, and time.   Psychiatric:         Mood and Affect: Mood normal.         Behavior: Behavior normal.          Airway        Visit Vitals  OB Status Postmenopausal   Smoking Status Former       CHADS2: 1.9%  RCRI: 0.4%  DASI: 36.7  METS: 7.3  ASA: II    Assessment and Plan:   Invasive ductal carcinoma - approximately 1.3 cm invasive ductal carcinoma grade 2-3 out of 3 ER/IN positive HER2/elizabeth negative. Per Dr. Martino notes: Patient with 2 new masses seen in the right breast 1 in the upper outer quadrant 1 in the lower medial quadrant-requiring biopsy prior to proceeding with her left breast cancer surgery.  Plan is for patient to have 1 last visit prior to surgery after the biopsies are complete. Patient is planned for Magseed localization lumpectomy left breast with sentinel node biopsy on 5/14/25 with Dr. Martino.  Left calf pain -  patient stated has had left calf pain and swelling extending into her thigh for 2 weeks-U/S LLE r/o DVT ordered  Abnormal stress test 3/19/25- follows with Dr. James  outpt- scheduled for cardiac catheterization 25 with Dr. Vera-on ASA PTA  Cough - chronic cough and intermittent chills x 5 days - COVID, Influenza A/B pending-continue mucinex  Obesity - BMI 29.70    Labs/Tests:  25- CBC, CMP, and COVID/Influenza A/B  EKG 3/14/25  LLE ultrasound r/o DVT 25         [1]   Past Medical History:  Diagnosis Date    Carpal tunnel syndrome    [2]   Past Surgical History:  Procedure Laterality Date    APPENDECTOMY      BREAST BIOPSY Left 2025    BREAST SURGERY Bilateral     bilateral breast cyst surgery for fibroids    CARPAL TUNNEL RELEASE       SECTION, LOW TRANSVERSE      x3 in the     CHOLECYSTECTOMY      laparoscopic    HYSTERECTOMY      bilateral tubes removed    LAPAROTOMY OOPHERECTOMY Bilateral     MANDIBLE SURGERY      TMJ implant   [3]   Family History  Problem Relation Name Age of Onset    Cancer Brother      Brain cancer Mother's Sister      Stroke Maternal Grandmother     [4]   Allergies  Allergen Reactions    Phenylephrine Shortness of breath     Other reaction(s): Difficulty Breathing    Pseudoephedrine Hcl Shortness of breath and Unknown    Chicken Derived Swelling     KF    Loratadine Unknown    Morphine Nausea/vomiting and Unknown     Other reaction(s): Vomiting   [5]   Current Outpatient Medications   Medication Sig Dispense Refill    albuterol (ProAir HFA) 90 mcg/actuation inhaler Inhale 2 puffs every 4 hours if needed for wheezing or shortness of breath. 8.5 g 5    aspirin 81 mg EC tablet Take 1 tablet (81 mg) by mouth once daily.      pseudoephedrine-guaifenesin (Mucinex D)  mg 12 hr tablet Take 1 tablet by mouth 2 times a day as needed for allergies. Do not crush, chew, or split.       No current facility-administered medications for this visit.

## 2025-04-25 NOTE — CPM/PAT H&P
CPM/PAT Evaluation       Name: Patsy Menendez (Patsy Menendez)  /Age: 1955/70 y.o.     In-Person       Chief Complaint: magseed localization,l umpectomy left breast/sentinel node biopsy    This is a 69 y/o female with PMHX significant for carpal tunnel syndrome, chest pain, abnormal stress test, and invasive ductal carcinoma that follows with Dr. Martino.  Patient with approximately 1.3 cm invasive ductal carcinoma grade 2-3 out of 3 ER/IN positive HER2/elizabeth negative. Per Dr. Martino notes: Patient with 2 new masses seen in the right breast 1 in the upper outer quadrant 1 in the lower medial quadrant-requiring biopsy prior to proceeding with her left breast cancer surgery.  Plan is for patient to have 1 last visit prior to surgery after the biopsies are complete. Patient is planned for Magseed localization lumpectomy left breast with sentinel node biopsy on 25 with Dr. Martino.    Patient denied fever, chest pain, shortness of breath, abdominal pain, nausea, vomiting, diarrhea, or urinary symptoms. Patient did endorse intermittent chills over last few days, but no fevers.  Patient denied history of CVA, CAD, DM2, PE/DVT or TERESITA.        Medical History[1]    Surgical History[2]    Patient  has no history on file for sexual activity.    Family History[3]  Social History     Tobacco Use    Smoking status: Former     Current packs/day: 0.00     Types: Cigarettes     Quit date: 1969     Years since quittin.3     Passive exposure: Past    Smokeless tobacco: Never   Substance Use Topics    Alcohol use: Yes     Comment: occ        Allergies[4]    Current Medications[5]     PAT ROS:   Constitutional:   neg    Neuro/Psych:   neg    Eyes:   neg    Ears:   neg    Nose:   neg    Mouth:   neg    Throat:   neg    Neck:   neg    Cardio:   neg    Respiratory:    cough  Endocrine:   neg    GI:   neg    :   neg    Musculoskeletal:   neg    Hematologic:   neg    Skin:  neg        Physical Exam  Vitals  reviewed.   Constitutional:       Appearance: Normal appearance. She is obese.   HENT:      Head: Normocephalic and atraumatic.      Right Ear: External ear normal.      Left Ear: External ear normal.      Nose: Nose normal.      Mouth/Throat:      Mouth: Mucous membranes are moist.      Pharynx: Oropharynx is clear.   Eyes:      Conjunctiva/sclera: Conjunctivae normal.      Pupils: Pupils are equal, round, and reactive to light.   Cardiovascular:      Rate and Rhythm: Normal rate and regular rhythm.      Pulses: Normal pulses.      Heart sounds: Normal heart sounds.   Pulmonary:      Effort: Pulmonary effort is normal.      Breath sounds: Normal breath sounds.   Abdominal:      General: Bowel sounds are normal.      Palpations: Abdomen is soft.   Musculoskeletal:         General: Normal range of motion.      Cervical back: Normal range of motion and neck supple.   Skin:     General: Skin is warm and dry.      Capillary Refill: Capillary refill takes less than 2 seconds.   Neurological:      General: No focal deficit present.      Mental Status: She is alert and oriented to person, place, and time.   Psychiatric:         Mood and Affect: Mood normal.         Behavior: Behavior normal.          Airway        Visit Vitals  OB Status Postmenopausal   Smoking Status Former       CHADS2: 1.9%  RCRI: 0.4%  DASI: 36.7  METS: 7.3  ASA: II    Assessment and Plan:   Invasive ductal carcinoma - approximately 1.3 cm invasive ductal carcinoma grade 2-3 out of 3 ER/ME positive HER2/elizabeth negative. Per Dr. Martino notes: Patient with 2 new masses seen in the right breast 1 in the upper outer quadrant 1 in the lower medial quadrant-requiring biopsy prior to proceeding with her left breast cancer surgery.  Plan is for patient to have 1 last visit prior to surgery after the biopsies are complete. Patient is planned for Magseed localization lumpectomy left breast with sentinel node biopsy on 5/14/25 with Dr. Martino.  Left calf pain -  patient stated has had left calf pain and swelling extending into her thigh for 2 weeks-U/S LLE r/o DVT ordered  Abnormal stress test 3/19/25- follows with Dr. James  outpt- scheduled for cardiac catheterization 25 with Dr. Vera-on ASA PTA  Cough - chronic cough and intermittent chills x 5 days - COVID, Influenza A/B pending-continue mucinex  Obesity - BMI 29.70    Labs/Tests:  25- CBC, CMP, and COVID/Influenza A/B  EKG 3/14/25  LLE ultrasound r/o DVT 25         [1]   Past Medical History:  Diagnosis Date    Carpal tunnel syndrome    [2]   Past Surgical History:  Procedure Laterality Date    APPENDECTOMY      BREAST BIOPSY Left 2025    BREAST SURGERY Bilateral     bilateral breast cyst surgery for fibroids    CARPAL TUNNEL RELEASE       SECTION, LOW TRANSVERSE      x3 in the     CHOLECYSTECTOMY      laparoscopic    HYSTERECTOMY      bilateral tubes removed    LAPAROTOMY OOPHERECTOMY Bilateral     MANDIBLE SURGERY      TMJ implant   [3]   Family History  Problem Relation Name Age of Onset    Cancer Brother      Brain cancer Mother's Sister      Stroke Maternal Grandmother     [4]   Allergies  Allergen Reactions    Phenylephrine Shortness of breath     Other reaction(s): Difficulty Breathing    Pseudoephedrine Hcl Shortness of breath and Unknown    Chicken Derived Swelling     KF    Loratadine Unknown    Morphine Nausea/vomiting and Unknown     Other reaction(s): Vomiting   [5]   Current Outpatient Medications   Medication Sig Dispense Refill    albuterol (ProAir HFA) 90 mcg/actuation inhaler Inhale 2 puffs every 4 hours if needed for wheezing or shortness of breath. 8.5 g 5    aspirin 81 mg EC tablet Take 1 tablet (81 mg) by mouth once daily.      pseudoephedrine-guaifenesin (Mucinex D)  mg 12 hr tablet Take 1 tablet by mouth 2 times a day as needed for allergies. Do not crush, chew, or split.       No current facility-administered medications for this visit.

## 2025-04-25 NOTE — PREPROCEDURE INSTRUCTIONS
Why must I stop eating and drinking near surgery time?  With sedation, food or liquid in your stomach can enter your lungs causing serious complications  Increases nausea and vomiting    When do I need to stop eating and drinking before my surgery?   Do not eat or drink after midnight the night before your surgery/procedure.  You may have small sips of water to take your medication.    PAT DISCHARGE INSTRUCTIONS    Please call the Same Day Surgery (SDS) Department of the hospital where your procedure will be performed after 2:00 PM the day before your surgery. If you are scheduled on a Monday, or a Tuesday following a Monday holiday, you will need to call on the last business day prior to your surgery.    [place] [id number] [place] [id number] [phone]  Second Floor      Please let your surgeon know if:      You develop any open sores, shingles, burning or painful urination as these may increase your risk of an infection.   You no longer wish to have the surgery.   Any other personal circumstances change that may lead to the need to cancel or defer this surgery-such as being sick or getting admitted to any hospital within one week of your planned procedure.    Your contact details change, such as a change of address or phone number.    Starting now:     Please DO NOT drink alcohol or smoke for 24 hours before surgery. It is well known that quitting smoking can make a huge difference to your health and recovery from surgery. The longer you abstain from smoking, the better your chances of a healthy recovery. If you need help with quitting, call 8-800-QUIT-NOW to be connected to a trained counselor who will discuss the best methods to help you quit.     Before your surgery:    Please stop all supplements 7 days prior to surgery. Or as directed by your surgeon.   Please stop taking NSAID pain medicine such as Advil and Motrin 7 days before surgery.    If you develop  any fever, cough, cold, rashes, cuts, scratches, scrapes, urinary symptoms or infection anywhere on your body (including teeth and gums) prior to surgery, please call your surgeon’s office as soon as possible. This may require treatment to reduce the chance of cancellation on the day of surgery.    The day before your surgery:   DIET- Please follow the diet instructions at the top of your packet.   Get a good night’s rest.  Use the special soap for bathing if you have been instructed to use one.    Scheduled surgery times may change and you will be notified if this occurs - please check your personal voicemail for any updates.     On the morning of surgery:   Wear comfortable, loose fitting clothes which open in the front. Please do not wear moisturizers, creams, lotions, makeup or perfume.    Please bring with you to surgery:   Photo ID and insurance card   Current list of medicines and allergies   Pacemaker/ Defibrillator/Heart stent cards   CPAP machine and mask    Slings/ splints/ crutches   A copy of your complete advanced directive/DHPOA.    Please do NOT bring with you to surgery:   All jewelry and valuables should be left at home.   Prosthetic devices such as contact lenses, hearing aids, dentures, eyelash extensions, hairpins and body piercings must be removed prior to going in to the surgical suite.    After outpatient surgery:   A responsible adult MUST accompany you at the time of discharge and stay with you for 24 hours after your surgery. You may NOT drive yourself home after surgery.    Do not drive, operate machinery, make critical decisions or do activities that require co-ordination or balance until after a night’s sleep.   Do not drink alcoholic beverages for 24 hours.   Instructions for resuming your medications will be provided by your surgeon.    CALL YOUR DOCTOR AFTER SURGERY IF YOU HAVE:     Chills and/or a fever of 101° F or higher.    Redness, swelling, pus or drainage from your surgical  wound or a bad smell from the wound.    Lightheadedness, fainting or confusion.    Persistent vomiting (throwing up) and are not able to eat or drink for 12 hours.    Three or more loose, watery bowel movements in 24 hours (diarrhea).   Difficulty or pain while urinating( after non-urological surgery)    Pain and swelling in your legs, especially if it is only on one side.    Difficulty breathing or are breathing faster than normal.    Any new concerning symptoms.           Medication List            Accurate as of April 25, 2025  1:47 PM. Always use your most recent med list.                albuterol 90 mcg/actuation inhaler  Commonly known as: ProAir HFA  Inhale 2 puffs every 4 hours if needed for wheezing or shortness of breath.  Medication Adjustments for Surgery: Take/Use as prescribed     aspirin 81 mg EC tablet  Additional Medication Adjustments for Surgery: PLEASE DISCUSS WITH CARDIOLOGIST IF YOU SHOULD TAKE OR HOLD FOR SURGERY BASED ON RADIOLOGY TEST RESULTS IN PAT TODAY     Mucinex D  mg 12 hr tablet  Generic drug: pseudoephedrine-guaifenesin  Medication Adjustments for Surgery: Do Not take on the morning of surgery

## 2025-04-29 ENCOUNTER — APPOINTMENT (OUTPATIENT)
Dept: CARDIOLOGY | Facility: HOSPITAL | Age: 70
End: 2025-04-29
Payer: MEDICARE

## 2025-04-29 ENCOUNTER — APPOINTMENT (OUTPATIENT)
Dept: UROLOGY | Facility: CLINIC | Age: 70
End: 2025-04-29
Payer: MEDICARE

## 2025-04-29 ENCOUNTER — HOSPITAL ENCOUNTER (OUTPATIENT)
Facility: HOSPITAL | Age: 70
Setting detail: OBSERVATION
Discharge: HOME | End: 2025-04-29
Attending: STUDENT IN AN ORGANIZED HEALTH CARE EDUCATION/TRAINING PROGRAM | Admitting: INTERNAL MEDICINE
Payer: MEDICARE

## 2025-04-29 ENCOUNTER — APPOINTMENT (OUTPATIENT)
Dept: RADIOLOGY | Facility: HOSPITAL | Age: 70
End: 2025-04-29
Payer: MEDICARE

## 2025-04-29 VITALS
TEMPERATURE: 96.6 F | HEIGHT: 66 IN | WEIGHT: 180.78 LBS | RESPIRATION RATE: 18 BRPM | OXYGEN SATURATION: 98 % | HEART RATE: 67 BPM | BODY MASS INDEX: 29.05 KG/M2 | DIASTOLIC BLOOD PRESSURE: 53 MMHG | SYSTOLIC BLOOD PRESSURE: 116 MMHG

## 2025-04-29 DIAGNOSIS — R07.9 CHEST PAIN, UNSPECIFIED TYPE: Primary | ICD-10-CM

## 2025-04-29 DIAGNOSIS — R91.1 PULMONARY NODULE: ICD-10-CM

## 2025-04-29 PROBLEM — C50.812 MALIGNANT NEOPLASM OF OVERLAPPING SITES OF LEFT FEMALE BREAST: Status: ACTIVE | Noted: 2025-04-29

## 2025-04-29 LAB
ABO GROUP (TYPE) IN BLOOD: NORMAL
ABO GROUP (TYPE) IN BLOOD: NORMAL
ALBUMIN SERPL BCP-MCNC: 3.9 G/DL (ref 3.4–5)
ALP SERPL-CCNC: 94 U/L (ref 33–136)
ALT SERPL W P-5'-P-CCNC: 10 U/L (ref 7–45)
ANION GAP SERPL CALCULATED.3IONS-SCNC: 12 MMOL/L (ref 10–20)
ANTIBODY SCREEN: NORMAL
APPEARANCE UR: CLEAR
AST SERPL W P-5'-P-CCNC: 10 U/L (ref 9–39)
ATRIAL RATE: 70 BPM
BASOPHILS # BLD AUTO: 0.06 X10*3/UL (ref 0–0.1)
BASOPHILS NFR BLD AUTO: 0.8 %
BB ANTIBODY IDENTIFICATION: NORMAL
BILIRUB SERPL-MCNC: 0.5 MG/DL (ref 0–1.2)
BILIRUB UR STRIP.AUTO-MCNC: NEGATIVE MG/DL
BUN SERPL-MCNC: 24 MG/DL (ref 6–23)
CALCIUM SERPL-MCNC: 8.7 MG/DL (ref 8.6–10.3)
CARDIAC TROPONIN I PNL SERPL HS: 3 NG/L (ref 0–13)
CARDIAC TROPONIN I PNL SERPL HS: <3 NG/L (ref 0–13)
CASE #: NORMAL
CHLORIDE SERPL-SCNC: 108 MMOL/L (ref 98–107)
CHOLEST SERPL-MCNC: 145 MG/DL (ref 0–199)
CHOLESTEROL/HDL RATIO: 4.2
CO2 SERPL-SCNC: 23 MMOL/L (ref 21–32)
COLOR UR: ABNORMAL
CREAT SERPL-MCNC: 0.67 MG/DL (ref 0.5–1.05)
EGFRCR SERPLBLD CKD-EPI 2021: >90 ML/MIN/1.73M*2
EOSINOPHIL # BLD AUTO: 0.31 X10*3/UL (ref 0–0.4)
EOSINOPHIL NFR BLD AUTO: 4.3 %
ERYTHROCYTE [DISTWIDTH] IN BLOOD BY AUTOMATED COUNT: 13.3 % (ref 11.5–14.5)
EST. AVERAGE GLUCOSE BLD GHB EST-MCNC: 97 MG/DL
GLUCOSE SERPL-MCNC: 92 MG/DL (ref 74–99)
GLUCOSE UR STRIP.AUTO-MCNC: NORMAL MG/DL
HBA1C MFR BLD: 5 % (ref ?–5.7)
HCT VFR BLD AUTO: 42.3 % (ref 36–46)
HDLC SERPL-MCNC: 34.4 MG/DL
HGB BLD-MCNC: 14.3 G/DL (ref 12–16)
IMM GRANULOCYTES # BLD AUTO: 0.05 X10*3/UL (ref 0–0.5)
IMM GRANULOCYTES NFR BLD AUTO: 0.7 % (ref 0–0.9)
INR PPP: 1 (ref 0.9–1.2)
KETONES UR STRIP.AUTO-MCNC: NEGATIVE MG/DL
LDLC SERPL CALC-MCNC: 94 MG/DL
LEUKOCYTE ESTERASE UR QL STRIP.AUTO: NEGATIVE
LYMPHOCYTES # BLD AUTO: 1.8 X10*3/UL (ref 0.8–3)
LYMPHOCYTES NFR BLD AUTO: 24.8 %
MAGNESIUM SERPL-MCNC: 2.12 MG/DL (ref 1.6–2.4)
MCH RBC QN AUTO: 27.8 PG (ref 26–34)
MCHC RBC AUTO-ENTMCNC: 33.8 G/DL (ref 32–36)
MCV RBC AUTO: 82 FL (ref 80–100)
MONOCYTES # BLD AUTO: 0.65 X10*3/UL (ref 0.05–0.8)
MONOCYTES NFR BLD AUTO: 8.9 %
NEUTROPHILS # BLD AUTO: 4.4 X10*3/UL (ref 1.6–5.5)
NEUTROPHILS NFR BLD AUTO: 60.5 %
NITRITE UR QL STRIP.AUTO: NEGATIVE
NON HDL CHOLESTEROL: 111 MG/DL (ref 0–149)
NRBC BLD-RTO: 0 /100 WBCS (ref 0–0)
P AXIS: 76 DEGREES
P OFFSET: 175 MS
P ONSET: 125 MS
PH UR STRIP.AUTO: 5.5 [PH]
PLATELET # BLD AUTO: 305 X10*3/UL (ref 150–450)
POTASSIUM SERPL-SCNC: 3.8 MMOL/L (ref 3.5–5.3)
PR INTERVAL: 170 MS
PROT SERPL-MCNC: 5.9 G/DL (ref 6.4–8.2)
PROT UR STRIP.AUTO-MCNC: NEGATIVE MG/DL
PROTHROMBIN TIME: 10.6 SECONDS (ref 9.3–12.7)
Q ONSET: 210 MS
QRS COUNT: 11 BEATS
QRS DURATION: 92 MS
QT INTERVAL: 400 MS
QTC CALCULATION(BAZETT): 432 MS
QTC FREDERICIA: 421 MS
R AXIS: -51 DEGREES
RBC # BLD AUTO: 5.14 X10*6/UL (ref 4–5.2)
RBC # UR STRIP.AUTO: NEGATIVE MG/DL
RH FACTOR (ANTIGEN D): NORMAL
RH FACTOR (ANTIGEN D): NORMAL
SODIUM SERPL-SCNC: 139 MMOL/L (ref 136–145)
SP GR UR STRIP.AUTO: >1.05
T AXIS: 42 DEGREES
T OFFSET: 410 MS
TRIGL SERPL-MCNC: 85 MG/DL (ref 0–149)
TSH SERPL-ACNC: 1.93 MIU/L (ref 0.44–3.98)
UROBILINOGEN UR STRIP.AUTO-MCNC: NORMAL MG/DL
VENTRICULAR RATE: 70 BPM
VLDL: 17 MG/DL (ref 0–40)
WBC # BLD AUTO: 7.3 X10*3/UL (ref 4.4–11.3)

## 2025-04-29 PROCEDURE — 83735 ASSAY OF MAGNESIUM: CPT | Performed by: INTERNAL MEDICINE

## 2025-04-29 PROCEDURE — 99223 1ST HOSP IP/OBS HIGH 75: CPT | Performed by: INTERNAL MEDICINE

## 2025-04-29 PROCEDURE — 2500000001 HC RX 250 WO HCPCS SELF ADMINISTERED DRUGS (ALT 637 FOR MEDICARE OP): Performed by: STUDENT IN AN ORGANIZED HEALTH CARE EDUCATION/TRAINING PROGRAM

## 2025-04-29 PROCEDURE — 87075 CULTR BACTERIA EXCEPT BLOOD: CPT | Mod: TRILAB,59 | Performed by: INTERNAL MEDICINE

## 2025-04-29 PROCEDURE — 85025 COMPLETE CBC W/AUTO DIFF WBC: CPT | Performed by: STUDENT IN AN ORGANIZED HEALTH CARE EDUCATION/TRAINING PROGRAM

## 2025-04-29 PROCEDURE — 71275 CT ANGIOGRAPHY CHEST: CPT

## 2025-04-29 PROCEDURE — 2550000001 HC RX 255 CONTRASTS: Performed by: STUDENT IN AN ORGANIZED HEALTH CARE EDUCATION/TRAINING PROGRAM

## 2025-04-29 PROCEDURE — 99222 1ST HOSP IP/OBS MODERATE 55: CPT | Performed by: INTERNAL MEDICINE

## 2025-04-29 PROCEDURE — 2500000001 HC RX 250 WO HCPCS SELF ADMINISTERED DRUGS (ALT 637 FOR MEDICARE OP)

## 2025-04-29 PROCEDURE — G0378 HOSPITAL OBSERVATION PER HR: HCPCS

## 2025-04-29 PROCEDURE — 80061 LIPID PANEL: CPT | Performed by: INTERNAL MEDICINE

## 2025-04-29 PROCEDURE — 84484 ASSAY OF TROPONIN QUANT: CPT | Performed by: STUDENT IN AN ORGANIZED HEALTH CARE EDUCATION/TRAINING PROGRAM

## 2025-04-29 PROCEDURE — 84443 ASSAY THYROID STIM HORMONE: CPT | Performed by: INTERNAL MEDICINE

## 2025-04-29 PROCEDURE — 93005 ELECTROCARDIOGRAM TRACING: CPT

## 2025-04-29 PROCEDURE — 99285 EMERGENCY DEPT VISIT HI MDM: CPT | Mod: 25 | Performed by: STUDENT IN AN ORGANIZED HEALTH CARE EDUCATION/TRAINING PROGRAM

## 2025-04-29 PROCEDURE — 2500000004 HC RX 250 GENERAL PHARMACY W/ HCPCS (ALT 636 FOR OP/ED): Mod: JZ | Performed by: INTERNAL MEDICINE

## 2025-04-29 PROCEDURE — 96372 THER/PROPH/DIAG INJ SC/IM: CPT | Performed by: INTERNAL MEDICINE

## 2025-04-29 PROCEDURE — 36415 COLL VENOUS BLD VENIPUNCTURE: CPT | Performed by: STUDENT IN AN ORGANIZED HEALTH CARE EDUCATION/TRAINING PROGRAM

## 2025-04-29 PROCEDURE — 86901 BLOOD TYPING SEROLOGIC RH(D): CPT | Performed by: INTERNAL MEDICINE

## 2025-04-29 PROCEDURE — 85610 PROTHROMBIN TIME: CPT | Performed by: INTERNAL MEDICINE

## 2025-04-29 PROCEDURE — 84484 ASSAY OF TROPONIN QUANT: CPT | Performed by: INTERNAL MEDICINE

## 2025-04-29 PROCEDURE — 83036 HEMOGLOBIN GLYCOSYLATED A1C: CPT | Mod: TRILAB | Performed by: INTERNAL MEDICINE

## 2025-04-29 PROCEDURE — 81003 URINALYSIS AUTO W/O SCOPE: CPT | Performed by: INTERNAL MEDICINE

## 2025-04-29 PROCEDURE — 80053 COMPREHEN METABOLIC PANEL: CPT | Performed by: STUDENT IN AN ORGANIZED HEALTH CARE EDUCATION/TRAINING PROGRAM

## 2025-04-29 RX ORDER — RANOLAZINE 500 MG/1
500 TABLET, EXTENDED RELEASE ORAL 2 TIMES DAILY
Status: DISCONTINUED | OUTPATIENT
Start: 2025-04-29 | End: 2025-04-29 | Stop reason: HOSPADM

## 2025-04-29 RX ORDER — PANTOPRAZOLE SODIUM 40 MG/1
40 TABLET, DELAYED RELEASE ORAL
Status: DISCONTINUED | OUTPATIENT
Start: 2025-04-30 | End: 2025-04-29 | Stop reason: HOSPADM

## 2025-04-29 RX ORDER — PANTOPRAZOLE SODIUM 40 MG/10ML
40 INJECTION, POWDER, LYOPHILIZED, FOR SOLUTION INTRAVENOUS
Status: DISCONTINUED | OUTPATIENT
Start: 2025-04-30 | End: 2025-04-29 | Stop reason: HOSPADM

## 2025-04-29 RX ORDER — RANOLAZINE 500 MG/1
500 TABLET, EXTENDED RELEASE ORAL 2 TIMES DAILY
Qty: 60 TABLET | Refills: 0 | Status: SHIPPED | OUTPATIENT
Start: 2025-04-29 | End: 2025-05-29

## 2025-04-29 RX ORDER — ACETAMINOPHEN 160 MG/5ML
650 SOLUTION ORAL EVERY 4 HOURS PRN
Status: DISCONTINUED | OUTPATIENT
Start: 2025-04-29 | End: 2025-04-29 | Stop reason: HOSPADM

## 2025-04-29 RX ORDER — ASPIRIN 81 MG/1
81 TABLET ORAL DAILY
Status: DISCONTINUED | OUTPATIENT
Start: 2025-04-30 | End: 2025-04-29 | Stop reason: HOSPADM

## 2025-04-29 RX ORDER — ACETAMINOPHEN 650 MG/1
650 SUPPOSITORY RECTAL EVERY 4 HOURS PRN
Status: DISCONTINUED | OUTPATIENT
Start: 2025-04-29 | End: 2025-04-29 | Stop reason: HOSPADM

## 2025-04-29 RX ORDER — ENOXAPARIN SODIUM 100 MG/ML
40 INJECTION SUBCUTANEOUS EVERY 24 HOURS
Status: DISCONTINUED | OUTPATIENT
Start: 2025-04-29 | End: 2025-04-29 | Stop reason: HOSPADM

## 2025-04-29 RX ORDER — NAPROXEN SODIUM 220 MG/1
324 TABLET, FILM COATED ORAL ONCE
Status: COMPLETED | OUTPATIENT
Start: 2025-04-29 | End: 2025-04-29

## 2025-04-29 RX ORDER — POLYETHYLENE GLYCOL 3350 17 G/17G
17 POWDER, FOR SOLUTION ORAL DAILY
Status: DISCONTINUED | OUTPATIENT
Start: 2025-04-29 | End: 2025-04-29 | Stop reason: HOSPADM

## 2025-04-29 RX ORDER — ACETAMINOPHEN 325 MG/1
650 TABLET ORAL EVERY 4 HOURS PRN
Status: DISCONTINUED | OUTPATIENT
Start: 2025-04-29 | End: 2025-04-29 | Stop reason: HOSPADM

## 2025-04-29 RX ORDER — NITROGLYCERIN 0.4 MG/1
0.4 TABLET SUBLINGUAL ONCE
Status: COMPLETED | OUTPATIENT
Start: 2025-04-29 | End: 2025-04-29

## 2025-04-29 RX ORDER — ALBUTEROL SULFATE 0.83 MG/ML
2.5 SOLUTION RESPIRATORY (INHALATION) EVERY 4 HOURS PRN
Status: DISCONTINUED | OUTPATIENT
Start: 2025-04-29 | End: 2025-04-29 | Stop reason: HOSPADM

## 2025-04-29 RX ADMIN — ENOXAPARIN SODIUM 40 MG: 40 INJECTION SUBCUTANEOUS at 09:40

## 2025-04-29 RX ADMIN — IOHEXOL 75 ML: 350 INJECTION, SOLUTION INTRAVENOUS at 04:39

## 2025-04-29 RX ADMIN — NITROGLYCERIN 0.4 MG: 0.4 TABLET SUBLINGUAL at 05:57

## 2025-04-29 RX ADMIN — ASPIRIN 324 MG: 81 TABLET, CHEWABLE ORAL at 05:57

## 2025-04-29 RX ADMIN — RANOLAZINE 500 MG: 500 TABLET, EXTENDED RELEASE ORAL at 11:49

## 2025-04-29 SDOH — HEALTH STABILITY: PHYSICAL HEALTH: ON AVERAGE, HOW MANY MINUTES DO YOU ENGAGE IN EXERCISE AT THIS LEVEL?: 0 MIN

## 2025-04-29 SDOH — HEALTH STABILITY: MENTAL HEALTH: HOW OFTEN DO YOU HAVE A DRINK CONTAINING ALCOHOL?: NEVER

## 2025-04-29 SDOH — ECONOMIC STABILITY: HOUSING INSECURITY: IN THE LAST 12 MONTHS, WAS THERE A TIME WHEN YOU WERE NOT ABLE TO PAY THE MORTGAGE OR RENT ON TIME?: YES

## 2025-04-29 SDOH — ECONOMIC STABILITY: FOOD INSECURITY: WITHIN THE PAST 12 MONTHS, THE FOOD YOU BOUGHT JUST DIDN'T LAST AND YOU DIDN'T HAVE MONEY TO GET MORE.: NEVER TRUE

## 2025-04-29 SDOH — ECONOMIC STABILITY: FOOD INSECURITY: HOW HARD IS IT FOR YOU TO PAY FOR THE VERY BASICS LIKE FOOD, HOUSING, MEDICAL CARE, AND HEATING?: NOT HARD AT ALL

## 2025-04-29 SDOH — ECONOMIC STABILITY: FOOD INSECURITY: WITHIN THE PAST 12 MONTHS, YOU WORRIED THAT YOUR FOOD WOULD RUN OUT BEFORE YOU GOT THE MONEY TO BUY MORE.: NEVER TRUE

## 2025-04-29 SDOH — SOCIAL STABILITY: SOCIAL INSECURITY
WITHIN THE LAST YEAR, HAVE YOU BEEN RAPED OR FORCED TO HAVE ANY KIND OF SEXUAL ACTIVITY BY YOUR PARTNER OR EX-PARTNER?: NO

## 2025-04-29 SDOH — SOCIAL STABILITY: SOCIAL INSECURITY: WITHIN THE LAST YEAR, HAVE YOU BEEN AFRAID OF YOUR PARTNER OR EX-PARTNER?: NO

## 2025-04-29 SDOH — ECONOMIC STABILITY: HOUSING INSECURITY: IN THE PAST 12 MONTHS, HOW MANY TIMES HAVE YOU MOVED WHERE YOU WERE LIVING?: 0

## 2025-04-29 SDOH — ECONOMIC STABILITY: TRANSPORTATION INSECURITY: IN THE PAST 12 MONTHS, HAS LACK OF TRANSPORTATION KEPT YOU FROM MEDICAL APPOINTMENTS OR FROM GETTING MEDICATIONS?: NO

## 2025-04-29 SDOH — SOCIAL STABILITY: SOCIAL INSECURITY
WITHIN THE LAST YEAR, HAVE YOU BEEN KICKED, HIT, SLAPPED, OR OTHERWISE PHYSICALLY HURT BY YOUR PARTNER OR EX-PARTNER?: NO

## 2025-04-29 SDOH — ECONOMIC STABILITY: HOUSING INSECURITY: AT ANY TIME IN THE PAST 12 MONTHS, WERE YOU HOMELESS OR LIVING IN A SHELTER (INCLUDING NOW)?: NO

## 2025-04-29 SDOH — ECONOMIC STABILITY: INCOME INSECURITY: IN THE PAST 12 MONTHS HAS THE ELECTRIC, GAS, OIL, OR WATER COMPANY THREATENED TO SHUT OFF SERVICES IN YOUR HOME?: NO

## 2025-04-29 SDOH — HEALTH STABILITY: MENTAL HEALTH
DO YOU FEEL STRESS - TENSE, RESTLESS, NERVOUS, OR ANXIOUS, OR UNABLE TO SLEEP AT NIGHT BECAUSE YOUR MIND IS TROUBLED ALL THE TIME - THESE DAYS?: TO SOME EXTENT

## 2025-04-29 SDOH — SOCIAL STABILITY: SOCIAL INSECURITY: WITHIN THE LAST YEAR, HAVE YOU BEEN HUMILIATED OR EMOTIONALLY ABUSED IN OTHER WAYS BY YOUR PARTNER OR EX-PARTNER?: NO

## 2025-04-29 SDOH — HEALTH STABILITY: PHYSICAL HEALTH: ON AVERAGE, HOW MANY DAYS PER WEEK DO YOU ENGAGE IN MODERATE TO STRENUOUS EXERCISE (LIKE A BRISK WALK)?: 0 DAYS

## 2025-04-29 SDOH — SOCIAL STABILITY: SOCIAL NETWORK: HOW OFTEN DO YOU GET TOGETHER WITH FRIENDS OR RELATIVES?: MORE THAN THREE TIMES A WEEK

## 2025-04-29 SDOH — SOCIAL STABILITY: SOCIAL NETWORK: HOW OFTEN DO YOU ATTEND CHURCH OR RELIGIOUS SERVICES?: NEVER

## 2025-04-29 SDOH — SOCIAL STABILITY: SOCIAL INSECURITY: DO YOU FEEL UNSAFE GOING BACK TO THE PLACE WHERE YOU ARE LIVING?: NO

## 2025-04-29 SDOH — SOCIAL STABILITY: SOCIAL INSECURITY: WERE YOU ABLE TO COMPLETE ALL THE BEHAVIORAL HEALTH SCREENINGS?: YES

## 2025-04-29 SDOH — SOCIAL STABILITY: SOCIAL NETWORK
IN A TYPICAL WEEK, HOW MANY TIMES DO YOU TALK ON THE PHONE WITH FAMILY, FRIENDS, OR NEIGHBORS?: MORE THAN THREE TIMES A WEEK

## 2025-04-29 SDOH — SOCIAL STABILITY: SOCIAL NETWORK
DO YOU BELONG TO ANY CLUBS OR ORGANIZATIONS SUCH AS CHURCH GROUPS, UNIONS, FRATERNAL OR ATHLETIC GROUPS, OR SCHOOL GROUPS?: NO

## 2025-04-29 SDOH — HEALTH STABILITY: MENTAL HEALTH: HOW MANY DRINKS CONTAINING ALCOHOL DO YOU HAVE ON A TYPICAL DAY WHEN YOU ARE DRINKING?: PATIENT DOES NOT DRINK

## 2025-04-29 SDOH — SOCIAL STABILITY: SOCIAL INSECURITY: ARE YOU MARRIED, WIDOWED, DIVORCED, SEPARATED, NEVER MARRIED, OR LIVING WITH A PARTNER?: MARRIED

## 2025-04-29 SDOH — HEALTH STABILITY: MENTAL HEALTH: HOW OFTEN DO YOU HAVE SIX OR MORE DRINKS ON ONE OCCASION?: NEVER

## 2025-04-29 SDOH — HEALTH STABILITY: PHYSICAL HEALTH
HOW OFTEN DO YOU NEED TO HAVE SOMEONE HELP YOU WHEN YOU READ INSTRUCTIONS, PAMPHLETS, OR OTHER WRITTEN MATERIAL FROM YOUR DOCTOR OR PHARMACY?: NEVER

## 2025-04-29 SDOH — SOCIAL STABILITY: SOCIAL NETWORK: HOW OFTEN DO YOU ATTEND MEETINGS OF THE CLUBS OR ORGANIZATIONS YOU BELONG TO?: NEVER

## 2025-04-29 SDOH — SOCIAL STABILITY: SOCIAL INSECURITY: ARE YOU OR HAVE YOU BEEN THREATENED OR ABUSED PHYSICALLY, EMOTIONALLY, OR SEXUALLY BY ANYONE?: NO

## 2025-04-29 SDOH — SOCIAL STABILITY: SOCIAL INSECURITY: ARE THERE ANY APPARENT SIGNS OF INJURIES/BEHAVIORS THAT COULD BE RELATED TO ABUSE/NEGLECT?: NO

## 2025-04-29 SDOH — SOCIAL STABILITY: SOCIAL INSECURITY: HAS ANYONE EVER THREATENED TO HURT YOUR FAMILY OR YOUR PETS?: NO

## 2025-04-29 SDOH — SOCIAL STABILITY: SOCIAL INSECURITY: DOES ANYONE TRY TO KEEP YOU FROM HAVING/CONTACTING OTHER FRIENDS OR DOING THINGS OUTSIDE YOUR HOME?: NO

## 2025-04-29 SDOH — SOCIAL STABILITY: SOCIAL INSECURITY: ABUSE: ADULT

## 2025-04-29 SDOH — SOCIAL STABILITY: SOCIAL INSECURITY: DO YOU FEEL ANYONE HAS EXPLOITED OR TAKEN ADVANTAGE OF YOU FINANCIALLY OR OF YOUR PERSONAL PROPERTY?: NO

## 2025-04-29 SDOH — SOCIAL STABILITY: SOCIAL INSECURITY: HAVE YOU HAD THOUGHTS OF HARMING ANYONE ELSE?: NO

## 2025-04-29 SDOH — SOCIAL STABILITY: SOCIAL INSECURITY: HAVE YOU HAD ANY THOUGHTS OF HARMING ANYONE ELSE?: NO

## 2025-04-29 ASSESSMENT — ACTIVITIES OF DAILY LIVING (ADL)
FEEDING YOURSELF: INDEPENDENT
LACK_OF_TRANSPORTATION: NO
JUDGMENT_ADEQUATE_SAFELY_COMPLETE_DAILY_ACTIVITIES: YES
GROOMING: INDEPENDENT
LACK_OF_TRANSPORTATION: NO
LACK_OF_TRANSPORTATION: NO
HEARING - RIGHT EAR: FUNCTIONAL
LACK_OF_TRANSPORTATION: NO
HEARING - LEFT EAR: FUNCTIONAL
ADEQUATE_TO_COMPLETE_ADL: YES
PATIENT'S MEMORY ADEQUATE TO SAFELY COMPLETE DAILY ACTIVITIES?: YES
LACK_OF_TRANSPORTATION: NO
BATHING: INDEPENDENT
DRESSING YOURSELF: INDEPENDENT
WALKS IN HOME: INDEPENDENT
TOILETING: INDEPENDENT

## 2025-04-29 ASSESSMENT — HEART SCORE
AGE: 65+
ECG: NORMAL
RISK FACTORS: >2 RISK FACTORS OR HX OF ATHEROSCLEROTIC DISEASE
HISTORY: SLIGHTLY SUSPICIOUS
TROPONIN: LESS THAN OR EQUAL TO NORMAL LIMIT
HEART SCORE: 4

## 2025-04-29 ASSESSMENT — PAIN SCALES - GENERAL
PAINLEVEL_OUTOF10: 4
PAINLEVEL_OUTOF10: 2

## 2025-04-29 ASSESSMENT — ENCOUNTER SYMPTOMS
MUSCULOSKELETAL NEGATIVE: 1
GASTROINTESTINAL NEGATIVE: 1
FATIGUE: 1
ALLERGIC/IMMUNOLOGIC NEGATIVE: 1
HEMATOLOGIC/LYMPHATIC NEGATIVE: 1
CONSTITUTIONAL NEGATIVE: 1
ACTIVITY CHANGE: 1
EYES NEGATIVE: 1
ENDOCRINE NEGATIVE: 1
PSYCHIATRIC NEGATIVE: 1
NEUROLOGICAL NEGATIVE: 1
CHEST TIGHTNESS: 1
RESPIRATORY NEGATIVE: 1

## 2025-04-29 ASSESSMENT — LIFESTYLE VARIABLES
AUDIT-C TOTAL SCORE: 0
HOW OFTEN DO YOU HAVE A DRINK CONTAINING ALCOHOL: NEVER
AUDIT-C TOTAL SCORE: 0
AUDIT-C TOTAL SCORE: 0
HOW OFTEN DO YOU HAVE 6 OR MORE DRINKS ON ONE OCCASION: NEVER
SKIP TO QUESTIONS 9-10: 1
SKIP TO QUESTIONS 9-10: 1
HOW MANY STANDARD DRINKS CONTAINING ALCOHOL DO YOU HAVE ON A TYPICAL DAY: PATIENT DOES NOT DRINK

## 2025-04-29 ASSESSMENT — PAIN DESCRIPTION - DESCRIPTORS
DESCRIPTORS: BURNING
DESCRIPTORS: ACHING
DESCRIPTORS: DISCOMFORT

## 2025-04-29 ASSESSMENT — PATIENT HEALTH QUESTIONNAIRE - PHQ9
2. FEELING DOWN, DEPRESSED OR HOPELESS: NOT AT ALL
1. LITTLE INTEREST OR PLEASURE IN DOING THINGS: NOT AT ALL
SUM OF ALL RESPONSES TO PHQ9 QUESTIONS 1 & 2: 0

## 2025-04-29 ASSESSMENT — COGNITIVE AND FUNCTIONAL STATUS - GENERAL
DAILY ACTIVITIY SCORE: 24
MOBILITY SCORE: 24
PATIENT BASELINE BEDBOUND: NO

## 2025-04-29 ASSESSMENT — PAIN DESCRIPTION - LOCATION: LOCATION: CHEST

## 2025-04-29 ASSESSMENT — PAIN DESCRIPTION - PROGRESSION: CLINICAL_PROGRESSION: NOT CHANGED

## 2025-04-29 ASSESSMENT — PAIN - FUNCTIONAL ASSESSMENT: PAIN_FUNCTIONAL_ASSESSMENT: 0-10

## 2025-04-29 ASSESSMENT — PAIN DESCRIPTION - PAIN TYPE: TYPE: ACUTE PAIN

## 2025-04-29 NOTE — CONSULTS
Reason For Consult  Chest pain    History Of Present Illness  Patsy Menendez is a 70 y.o. female presenting with chest pain.  Patient has a past medical history significant for invasive ductal carcinoma for which she is scheduled for a lumpectomy on , as well as an abnormal stress test done by Dr. Rod, for which she is scheduled heart catheterization on .  Patient describes an aching on the left chest.  Does not radiate anywhere.  States the pain worsens lying flat or on her side.  There is no exertional component.  She denies shortness of breath.  She also feels very rundown and tired.  She complains of head congestion as well. ED workup reveals sodium of 139, potassium of 3.8, creatinine 0.67.  High-sensitivity troponin negative at 3 and 3.  Lipid panel negative.  CBC within normal limits.  She had a CT angio which was negative for pulmonary embolism she did have a left lower lobe as well as a right lower lobe pulmonary nodule.  Her EKG showed normal sinus rhythm without evidence of ischemia.  She was given full dose 2024 mg aspirin as well as a sublingual nitroglycerin tablet and admitted for further workup and evaluation.     Past Medical History  She has a past medical history of Adverse effect of anesthesia and Carpal tunnel syndrome.    Surgical History  She has a past surgical history that includes Hysterectomy (); Appendectomy; Cholecystectomy ();  section, low transverse; Carpal tunnel release; Laparotomy oopherectomy (Bilateral, ); Breast surgery (Bilateral); Mandible surgery; and Breast biopsy (Left, 2025).     Social History  She reports that she quit smoking about 53 years ago. Her smoking use included cigarettes. She started smoking about 56 years ago. She has a 0.8 pack-year smoking history. She has been exposed to tobacco smoke. She has never used smokeless tobacco. She reports current alcohol use. She reports that she does not use drugs.    Family  "History  Family History[1]     Allergies  Phenylephrine, Pseudoephedrine hcl, Chicken derived, Loratadine, and Morphine    Review of Systems  Review of Systems   Constitutional:  Positive for activity change and fatigue.   Respiratory:  Positive for chest tightness.    Cardiovascular:  Positive for chest pain.   All other systems reviewed and are negative.         Physical Exam  Physical Exam  Constitutional:       Appearance: Normal appearance. She is not ill-appearing.   Cardiovascular:      Rate and Rhythm: Normal rate and regular rhythm.      Pulses: Normal pulses.      Heart sounds: Normal heart sounds.   Pulmonary:      Effort: Pulmonary effort is normal.      Breath sounds: Normal breath sounds.   Musculoskeletal:      Right lower leg: No edema.      Left lower leg: No edema.   Skin:     General: Skin is warm and dry.   Neurological:      Mental Status: She is alert and oriented to person, place, and time.           Last Recorded Vitals  Blood pressure 116/53, pulse 67, temperature 35.9 °C (96.6 °F), temperature source Temporal, resp. rate 18, height 1.676 m (5' 6\"), weight 82 kg (180 lb 12.4 oz), SpO2 98%.    Relevant Results  Results for orders placed or performed during the hospital encounter of 04/29/25 (from the past 24 hours)   ECG 12 Lead   Result Value Ref Range    Ventricular Rate 70 BPM    Atrial Rate 70 BPM    SC Interval 170 ms    QRS Duration 92 ms    QT Interval 400 ms    QTC Calculation(Bazett) 432 ms    P Axis 76 degrees    R Axis -51 degrees    T Axis 42 degrees    QRS Count 11 beats    Q Onset 210 ms    P Onset 125 ms    P Offset 175 ms    T Offset 410 ms    QTC Fredericia 421 ms   CBC and Auto Differential   Result Value Ref Range    WBC 7.3 4.4 - 11.3 x10*3/uL    nRBC 0.0 0.0 - 0.0 /100 WBCs    RBC 5.14 4.00 - 5.20 x10*6/uL    Hemoglobin 14.3 12.0 - 16.0 g/dL    Hematocrit 42.3 36.0 - 46.0 %    MCV 82 80 - 100 fL    MCH 27.8 26.0 - 34.0 pg    MCHC 33.8 32.0 - 36.0 g/dL    RDW 13.3 11.5 - " 14.5 %    Platelets 305 150 - 450 x10*3/uL    Neutrophils % 60.5 40.0 - 80.0 %    Immature Granulocytes %, Automated 0.7 0.0 - 0.9 %    Lymphocytes % 24.8 13.0 - 44.0 %    Monocytes % 8.9 2.0 - 10.0 %    Eosinophils % 4.3 0.0 - 6.0 %    Basophils % 0.8 0.0 - 2.0 %    Neutrophils Absolute 4.40 1.60 - 5.50 x10*3/uL    Immature Granulocytes Absolute, Automated 0.05 0.00 - 0.50 x10*3/uL    Lymphocytes Absolute 1.80 0.80 - 3.00 x10*3/uL    Monocytes Absolute 0.65 0.05 - 0.80 x10*3/uL    Eosinophils Absolute 0.31 0.00 - 0.40 x10*3/uL    Basophils Absolute 0.06 0.00 - 0.10 x10*3/uL   Comprehensive Metabolic Panel   Result Value Ref Range    Glucose 92 74 - 99 mg/dL    Sodium 139 136 - 145 mmol/L    Potassium 3.8 3.5 - 5.3 mmol/L    Chloride 108 (H) 98 - 107 mmol/L    Bicarbonate 23 21 - 32 mmol/L    Anion Gap 12 10 - 20 mmol/L    Urea Nitrogen 24 (H) 6 - 23 mg/dL    Creatinine 0.67 0.50 - 1.05 mg/dL    eGFR >90 >60 mL/min/1.73m*2    Calcium 8.7 8.6 - 10.3 mg/dL    Albumin 3.9 3.4 - 5.0 g/dL    Alkaline Phosphatase 94 33 - 136 U/L    Total Protein 5.9 (L) 6.4 - 8.2 g/dL    AST 10 9 - 39 U/L    Bilirubin, Total 0.5 0.0 - 1.2 mg/dL    ALT 10 7 - 45 U/L   Troponin I, High Sensitivity, Initial   Result Value Ref Range    Troponin I, High Sensitivity 3 0 - 13 ng/L   Troponin, High Sensitivity, 1 Hour   Result Value Ref Range    Troponin I, High Sensitivity <3 0 - 13 ng/L   Magnesium   Result Value Ref Range    Magnesium 2.12 1.60 - 2.40 mg/dL   Protime-INR   Result Value Ref Range    Protime 10.6 9.3 - 12.7 seconds    INR 1.0 0.9 - 1.2   Lipid Panel   Result Value Ref Range    Cholesterol 145 0 - 199 mg/dL    HDL-Cholesterol 34.4 mg/dL    Cholesterol/HDL Ratio 4.2     LDL Calculated 94 <=99 mg/dL    VLDL 17 0 - 40 mg/dL    Triglycerides 85 0 - 149 mg/dL    Non HDL Cholesterol 111 0 - 149 mg/dL   TSH with reflex to Free T4 if abnormal   Result Value Ref Range    Thyroid Stimulating Hormone 1.93 0.44 - 3.98 mIU/L   Troponin I,  High Sensitivity, Initial   Result Value Ref Range    Troponin I, High Sensitivity <3 0 - 13 ng/L          Assessment/Plan     Atypical chest pain  Ductal carcinoma  Abnormal stress test (remote)    Overall impression/plan:    4/29: 70-year-old female with breast cancer scheduled for lumpectomy on 5/9.  Additionally she had a abnormal stress test with her cardiologist Dr. James.  She is scheduled to have a heart catheterization on 5/14.  She presents to the ED with the complaint of chest discomfort and generalized fatigue.  Describes this chest pain as a dull ache in the left side of her chest does not radiate anywhere.  Tells me it worsens when lying flat.  Workup thus far has been essentially normal.  Troponins negative x 2.  EKG is nonischemic.  CT angio was negative for pulmonary embolism nor is there significant atherosclerosis seen in her coronary arteries.  Chest pain is rather atypical in nature and I have very low suspicion at this time for acute coronary syndrome.  Will order an echocardiogram to assess her LV function.  She also tells me she has a history of having a heart murmur which I did not appreciate.  Will assess her valves with the echo. Given that she is scheduled to have lumpectomy next week, it is reasonable to defer cath until after with a non-ischemic EKG and negative troponin.  Blood pressure stable most recent reading 116/53.  She is saturating well on room air at 98%.  Remains in normal sinus rhythm on telemetry without significant ectopy.    I spent 60 minutes in the professional and overall care of this patient.      Cintia Salinas PA-C         [1]   Family History  Problem Relation Name Age of Onset    Cancer Brother      Brain cancer Mother's Sister      Stroke Maternal Grandmother

## 2025-04-29 NOTE — ED PROVIDER NOTES
HPI   Chief Complaint   Patient presents with    Chest Pain     Pt states that she has been having chest discomfort for 20 mins pta along with lightheadedness.  Pt states that she has had a cold for about a week but has a blockage in herheart that she's getting stented in a few weeks.  Pt states she also has breaswt cancer and is having surgery for that a few days before her stents are placed.       Patient presents with chest pain and lightheadedness.  She reports that for the last week and a half, she has had congestion and has not felt well.  She does also have breast cancer which is currently in the preoperative phase and she has not received treatment for it yet.  She reportedly had an abnormal stress test in March and is scheduled for heart catheterization in May.  She reports that the pain is located in the middle of the chest and is described as a pressure.  It is worse when she lays down.  She denies any shortness of breath.  No swelling in the legs.              Patient History   Medical History[1]  Surgical History[2]  Family History[3]  Social History[4]    Physical Exam   ED Triage Vitals [04/29/25 0324]   Temperature Heart Rate Respirations BP   36.7 °C (98.1 °F) 73 17 140/67      Pulse Ox Temp Source Heart Rate Source Patient Position   98 % Oral Monitor Sitting      BP Location FiO2 (%)     Right arm --       Physical Exam  HENT:      Head: Normocephalic.   Cardiovascular:      Rate and Rhythm: Normal rate and regular rhythm.      Heart sounds: Normal heart sounds.   Pulmonary:      Effort: Pulmonary effort is normal.      Breath sounds: Normal breath sounds.   Musculoskeletal:      Comments: No pretibial edema   Neurological:      Mental Status: She is alert.           ED Course & MDM   ED Course as of 04/29/25 0459   Tue Apr 29, 2025   0333 EKG interpreted by me: Normal sinus rhythm, rate 70.  Leftward axis.  LAFB.  No significant ST or T wave abnormalities. [ML]      ED Course User Index  [ML]  Andres Lance MD         Diagnoses as of 25 0459   Chest pain, unspecified type   Pulmonary nodule                 No data recorded     [place] Coma Scale Score: 15 (25 0333 : Angelica Rossi RN)                           Medical Decision Making  In the setting of chest pain with known active cancer, CT angio of the chest was obtained to evaluate for PE.  This was negative, however does reveal pulmonary nodules.  Patient continuing to have some discomfort in her chest/continuing to say that her chest does not feel normal.  Patient was given aspirin.  I did also order nitroglycerin.  Troponin x 2 is unremarkable.  My suspicion for aortic dissection, esophageal perforation, pneumothorax is very low.  Patient accepted to medicine service for further evaluation of chest pain in setting of known abnormal stress test recently.  Parts of this chart were completed with dictation software, please excuse any errors in transcription.        Procedure  Procedures         [1]   Past Medical History:  Diagnosis Date    Adverse effect of anesthesia     CONCERNED ABOUT PRESSURE ON FACE WHILE UNDER. HS NASAL FRACTURE 5 YEARS AGO.    Carpal tunnel syndrome    [2]   Past Surgical History:  Procedure Laterality Date    APPENDECTOMY      BREAST BIOPSY Left 2025    BREAST SURGERY Bilateral     bilateral breast cyst surgery for fibroids    CARPAL TUNNEL RELEASE       SECTION, LOW TRANSVERSE      x3 in the 70s    CHOLECYSTECTOMY      laparoscopic    HYSTERECTOMY      bilateral tubes removed    LAPAROTOMY OOPHERECTOMY Bilateral     MANDIBLE SURGERY      TMJ implant   [3]   Family History  Problem Relation Name Age of Onset    Cancer Brother      Brain cancer Mother's Sister      Stroke Maternal Grandmother     [4]   Social History  Tobacco Use    Smoking status: Former     Current packs/day: 0.00     Average packs/day: 0.3 packs/day for 3.3 years (0.8 ttl pk-yrs)     Types: Cigarettes     Start date:  1969     Quit date: 1972     Years since quittin.0     Passive exposure: Past    Smokeless tobacco: Never   Vaping Use    Vaping status: Never Used   Substance Use Topics    Alcohol use: Yes     Comment: OCCAIONAL    Drug use: Never        Andres Lance MD  25 0552

## 2025-04-29 NOTE — PROGRESS NOTES
04/29/25 1331   Discharge Planning   Living Arrangements Spouse/significant other   Support Systems Spouse/significant other   Assistance Needed independent   Type of Residence Private residence  (ranch home)   Number of Stairs to Enter Residence 0   Number of Stairs Within Residence 0   Do you have animals or pets at home? No   Who is requesting discharge planning? Provider   Home or Post Acute Services None   Expected Discharge Disposition Home   Does the patient need discharge transport arranged? No   Financial Resource Strain   How hard is it for you to pay for the very basics like food, housing, medical care, and heating? Not hard   Housing Stability   In the last 12 months, was there a time when you were not able to pay the mortgage or rent on time? Y   In the past 12 months, how many times have you moved where you were living? 0   At any time in the past 12 months, were you homeless or living in a shelter (including now)? N   Transportation Needs   In the past 12 months, has lack of transportation kept you from medical appointments or from getting medications? no   In the past 12 months, has lack of transportation kept you from meetings, work, or from getting things needed for daily living? No     70-year-old female with breast cancer scheduled for lumpectomy on 5/9. Additionally she had a abnormal stress test with her cardiologist Dr. James. She is scheduled to have a heart catheterization on 5/14. She presents to the ED with the complaint of chest discomfort and generalized fatigue. Describes this chest pain as a dull ache in the left side of her chest does not radiate anywhere.     Patient lives with spouse in a ranch home. Independent with ADLs and IADLs, retired. Has a supportive family and children. Will have no need upon discharge.    PLAN Home with no needs. Will need to follow up with her appointments as mentioned above.

## 2025-04-29 NOTE — PROGRESS NOTES
04/29/25 1336   [facility] Disability Status   Are you deaf or do you have serious difficulty hearing? N   Are you blind or do you have serious difficulty seeing, even when wearing glasses? N   Because of a physical, mental, or emotional condition, do you have serious difficulty concentrating, remembering, or making decisions? (5 years old or older) N   Do you have serious difficulty walking or climbing stairs? Y   Do you have serious difficulty dressing or bathing? N   Because of a physical, mental, or emotional condition, do you have serious difficulty doing errands alone such as visiting the doctor? N

## 2025-04-29 NOTE — PROGRESS NOTES
04/29/25 1336   Physical Activity   On average, how many days per week do you engage in moderate to strenuous exercise (like a brisk walk)? 0 days   On average, how many minutes do you engage in exercise at this level? 0 min   Financial Resource Strain   How hard is it for you to pay for the very basics like food, housing, medical care, and heating? Not hard   Housing Stability   In the last 12 months, was there a time when you were not able to pay the mortgage or rent on time? Y   In the past 12 months, how many times have you moved where you were living? 0   At any time in the past 12 months, were you homeless or living in a shelter (including now)? N   Transportation Needs   In the past 12 months, has lack of transportation kept you from medical appointments or from getting medications? no   In the past 12 months, has lack of transportation kept you from meetings, work, or from getting things needed for daily living? No   Food Insecurity   Within the past 12 months, you worried that your food would run out before you got the money to buy more. Never true   Within the past 12 months, the food you bought just didn't last and you didn't have money to get more. Never true   Stress   Do you feel stress - tense, restless, nervous, or anxious, or unable to sleep at night because your mind is troubled all the time - these days? To some exte   Social Connections   In a typical week, how many times do you talk on the phone with family, friends, or neighbors? More than 3   How often do you get together with friends or relatives? More than 3   How often do you attend Islam or Taoism services? Never   Do you belong to any clubs or organizations such as Islam groups, unions, fraternal or athletic groups, or school groups? No   How often do you attend meetings of the clubs or organizations you belong to? Never   Are you , , , , never , or living with a partner?    Intimate  Partner Violence   Within the last year, have you been afraid of your partner or ex-partner? No   Within the last year, have you been humiliated or emotionally abused in other ways by your partner or ex-partner? No   Within the last year, have you been kicked, hit, slapped, or otherwise physically hurt by your partner or ex-partner? No   Within the last year, have you been raped or forced to have any kind of sexual activity by your partner or ex-partner? No   Alcohol Use   Q1: How often do you have a drink containing alcohol? Never   Q2: How many drinks containing alcohol do you have on a typical day when you are drinking? None   Q3: How often do you have six or more drinks on one occasion? Never   Utilities   In the past 12 months has the electric, gas, oil, or water company threatened to shut off services in your home? No

## 2025-04-29 NOTE — H&P
History Of Present Illness  Patsy Menendez is a 70 y.o. female presenting with chest pain.  This patient with recent diagnosis of breast cancer and also recent reported nuclear stress test positive woke up in the melanite go to the bathroom.  To that she experienced some precordial chest discomfort that was the reason for the ER visit EKG was negative troponins were negative.  CT angio was negative.  However based the reported positive nuclear stress test ER physician wanted her admitted so cardiology can see her and maybe schedule heart cath earlier than later.  The patient however tells me that she is post first have the breast cancer surgery on the  and the heart cath is delayed after.  She still has some vague precordial pain the nitroglycerin actually made it worse it also gave her headache there is no nausea no vomiting no abdominal pain no shortness of breath no palpitations no leg swelling no calf tenderness  Past Medical History  She has a past medical history of Adverse effect of anesthesia and Carpal tunnel syndrome.  See above  Surgical History  She has a past surgical history that includes Hysterectomy (); Appendectomy; Cholecystectomy ();  section, low transverse; Carpal tunnel release; Laparotomy oopherectomy (Bilateral, ); Breast surgery (Bilateral); Mandible surgery; and Breast biopsy (Left, 2025).  Reviewed  Social History  She reports that she quit smoking about 53 years ago. Her smoking use included cigarettes. She started smoking about 56 years ago. She has a 0.8 pack-year smoking history. She has been exposed to tobacco smoke. She has never used smokeless tobacco. She reports current alcohol use. She reports that she does not use drugs.  Reviewed  Family History  Family History[1]     Allergies  Phenylephrine, Pseudoephedrine hcl, Chicken derived, Loratadine, and Morphine    ROS  Review of Systems   Constitutional: Negative.    HENT: Negative.     Eyes:  Negative.    Respiratory: Negative.     Cardiovascular:  Positive for chest pain.   Gastrointestinal: Negative.    Endocrine: Negative.    Genitourinary: Negative.    Musculoskeletal: Negative.    Skin: Negative.    Allergic/Immunologic: Negative.    Neurological: Negative.    Hematological: Negative.    Psychiatric/Behavioral: Negative.     All other systems reviewed and are negative.       Last Recorded Vitals  /81   Pulse 77   Temp 36.7 °C (98.1 °F) (Oral)   Resp 19   Wt 82 kg (180 lb 12.4 oz)   SpO2 95%     Physical Exam  Assessed patient emergency room 18 in the presence of her .   male alert oriented x 3 cooperative no distress  Normocephalic/atraumatic EOMI PERRLA  Neck supple  Chest clear  Heart regular  Abdomen soft nontender  Extremities no peripheral edema good pedal pulses  Neurologic examination gross muscles nonfocal  Musculoskeletal there is reproducible chest pain upon palpation of anterior chest wall  Psych no psychosis  Skin no rash    Relevant Results  Reviewed    ASSESSMENT/PLAN  Assessment/Plan   Assessment & Plan  Chest pain    Malignant neoplasm of overlapping sites of left female breast    Chest pain is atypical.  Will ask cardiology to evaluate the case and decide if cardiac cath can be done earlier.  I have low suspicion that this is angina.       Lauro Garner MD         [1]   Family History  Problem Relation Name Age of Onset    Cancer Brother      Brain cancer Mother's Sister      Stroke Maternal Grandmother

## 2025-04-29 NOTE — CARE PLAN
The patient's goals for the shift include      The clinical goals for the shift include monitor pain    Over the shift, the patient did not make progress toward the following goals. Barriers to progression include . Recommendations to address these barriers include   Problem: Pain - Adult  Goal: Verbalizes/displays adequate comfort level or baseline comfort level  Outcome: Progressing     Problem: Safety - Adult  Goal: Free from fall injury  Outcome: Progressing     Problem: Chronic Conditions and Co-morbidities  Goal: Patient's chronic conditions and co-morbidity symptoms are monitored and maintained or improved  Outcome: Progressing     Problem: Fall/Injury  Goal: Not fall by end of shift  Outcome: Progressing  Goal: Be free from injury by end of the shift  Outcome: Progressing  Goal: Verbalize understanding of personal risk factors for fall in the hospital  Outcome: Progressing  Goal: Verbalize understanding of risk factor reduction measures to prevent injury from fall in the home  Outcome: Progressing  Goal: Use assistive devices by end of the shift  Outcome: Progressing  Goal: Pace activities to prevent fatigue by end of the shift  Outcome: Progressing     Problem: Pain  Goal: Takes deep breaths with improved pain control throughout the shift  Outcome: Progressing  Goal: Turns in bed with improved pain control throughout the shift  Outcome: Progressing  Goal: Walks with improved pain control throughout the shift  Outcome: Progressing  Goal: Performs ADL's with improved pain control throughout shift  Outcome: Progressing  Goal: Participates in PT with improved pain control throughout the shift  Outcome: Progressing  Goal: Free from opioid side effects throughout the shift  Outcome: Progressing  Goal: Free from acute confusion related to pain meds throughout the shift  Outcome: Progressing   .    
no

## 2025-05-01 ENCOUNTER — PATIENT OUTREACH (OUTPATIENT)
Dept: PRIMARY CARE | Facility: CLINIC | Age: 70
End: 2025-05-01
Payer: MEDICARE

## 2025-05-01 NOTE — PROGRESS NOTES
TCM completed 05/01/25   Discharge Facility:  [facility]  Discharge Diagnosis: Chest pain   Admission Date: 4/29/25  Discharge Date: 4/29/25     PCP Appointment Date:    No appts scheduled     (Needs seen by: 5/12/25)  Specialist Appointment Date:   Cardiology-  Heart cath scheduled for 5/14/25  Hem/Onc-  Lumpectomy scheduled for 5/9/25  Urology- 5/6/25    Hospital Encounter and Summary Linked: Yes  ED to Hosp-Admission (Discharged) with Kevin Oakes MD; Andres Lance MD (04/29/2025)                          Unable to reach patient; Two attempts were made to reach patient within two business days after discharge. Voicemail left with contact information for patient to call back with any non-emergent questions or concerns. No return call as of this note.  Needs seen by: 5/12/25.

## 2025-05-03 LAB
BACTERIA BLD CULT: NORMAL
BACTERIA BLD CULT: NORMAL

## 2025-05-06 ENCOUNTER — APPOINTMENT (OUTPATIENT)
Dept: UROLOGY | Facility: CLINIC | Age: 70
End: 2025-05-06
Payer: MEDICARE

## 2025-05-06 DIAGNOSIS — N28.1 RENAL CYST: Primary | ICD-10-CM

## 2025-05-06 DIAGNOSIS — R10.84 GENERALIZED ABDOMINAL PAIN: ICD-10-CM

## 2025-05-06 PROCEDURE — 1159F MED LIST DOCD IN RCRD: CPT | Performed by: SURGERY

## 2025-05-06 PROCEDURE — 1123F ACP DISCUSS/DSCN MKR DOCD: CPT | Performed by: SURGERY

## 2025-05-06 PROCEDURE — 1036F TOBACCO NON-USER: CPT | Performed by: SURGERY

## 2025-05-06 PROCEDURE — 99203 OFFICE O/P NEW LOW 30 MIN: CPT | Performed by: SURGERY

## 2025-05-06 PROCEDURE — 1126F AMNT PAIN NOTED NONE PRSNT: CPT | Performed by: SURGERY

## 2025-05-06 PROCEDURE — 1160F RVW MEDS BY RX/DR IN RCRD: CPT | Performed by: SURGERY

## 2025-05-06 ASSESSMENT — PAIN SCALES - GENERAL: PAINLEVEL_OUTOF10: 0-NO PAIN

## 2025-05-06 NOTE — PROGRESS NOTES
Subjective   Patient ID: Patsy Menendez is a 70 y.o. female who presents for Flank Pain.    HPI  She was referred by her primary care.  Over the past 6 months she has been having general abdominal and back pain.  Her pain is not localized.  Occasionally she would have pain that radiates down her right leg.  She recently was diagnosed with breast cancer.  She is due to see oncology.  She has no history of urinary stone disease.  She denies any perlita hematuria.  She has no voiding complaints.          Review of Systems  As per HPI, remaining 10 systems negative            Objective   Physical Exam  Well nourished  Breathing comfortably  Abdomen obese, soft, ND, NT  Midline scar, no hernias  No CVA tenderness                Assessment/Plan   Problem List Items Addressed This Visit           ICD-10-CM       Genitourinary and Reproductive    Kidney cysts N28.1     Other Visit Diagnoses         Codes      Renal cyst    -  Primary N28.1    Relevant Orders    XR abdomen 1 view             I reviewed her abdominal and renal ultrasound.  Both kidneys are normal in size without any hydronephrosis.  There is a large lower pole right renal cyst.  I reassured her that these are benign and do not cause pain.    Her pain is likely musculoskeletal in nature.  I advised her to follow-up with her primary care.            Shilpa Stinson MD 05/06/25 8:54 AM

## 2025-05-09 ENCOUNTER — HOSPITAL ENCOUNTER (OUTPATIENT)
Dept: RADIOLOGY | Facility: HOSPITAL | Age: 70
Discharge: HOME | End: 2025-05-09
Payer: MEDICARE

## 2025-05-09 ENCOUNTER — ANESTHESIA (OUTPATIENT)
Dept: OPERATING ROOM | Facility: HOSPITAL | Age: 70
End: 2025-05-09
Payer: MEDICARE

## 2025-05-09 ENCOUNTER — HOSPITAL ENCOUNTER (OUTPATIENT)
Facility: HOSPITAL | Age: 70
Setting detail: OUTPATIENT SURGERY
Discharge: HOME | End: 2025-05-09
Attending: SURGERY | Admitting: SURGERY
Payer: MEDICARE

## 2025-05-09 ENCOUNTER — PHARMACY VISIT (OUTPATIENT)
Dept: PHARMACY | Facility: CLINIC | Age: 70
End: 2025-05-09
Payer: COMMERCIAL

## 2025-05-09 ENCOUNTER — HOSPITAL ENCOUNTER (OUTPATIENT)
Dept: RADIOLOGY | Facility: HOSPITAL | Age: 70
End: 2025-05-09
Payer: MEDICARE

## 2025-05-09 ENCOUNTER — APPOINTMENT (OUTPATIENT)
Dept: CARDIOLOGY | Facility: HOSPITAL | Age: 70
End: 2025-05-09
Payer: MEDICARE

## 2025-05-09 ENCOUNTER — ANESTHESIA EVENT (OUTPATIENT)
Dept: OPERATING ROOM | Facility: HOSPITAL | Age: 70
End: 2025-05-09
Payer: MEDICARE

## 2025-05-09 VITALS
HEART RATE: 65 BPM | BODY MASS INDEX: 29.41 KG/M2 | HEIGHT: 66 IN | DIASTOLIC BLOOD PRESSURE: 67 MMHG | RESPIRATION RATE: 14 BRPM | TEMPERATURE: 97.9 F | SYSTOLIC BLOOD PRESSURE: 131 MMHG | WEIGHT: 183 LBS | OXYGEN SATURATION: 96 %

## 2025-05-09 DIAGNOSIS — R07.9 CHEST PAIN, UNSPECIFIED TYPE: ICD-10-CM

## 2025-05-09 DIAGNOSIS — C50.912 INFILTRATING DUCTAL CARCINOMA OF LEFT BREAST: ICD-10-CM

## 2025-05-09 DIAGNOSIS — C50.812 MALIGNANT NEOPLASM OF OVERLAPPING SITES OF LEFT BREAST IN FEMALE, ESTROGEN RECEPTOR POSITIVE: Primary | ICD-10-CM

## 2025-05-09 DIAGNOSIS — Z17.0 MALIGNANT NEOPLASM OF OVERLAPPING SITES OF LEFT BREAST IN FEMALE, ESTROGEN RECEPTOR POSITIVE: Primary | ICD-10-CM

## 2025-05-09 DIAGNOSIS — N63.11 MASS OF UPPER OUTER QUADRANT OF RIGHT BREAST: ICD-10-CM

## 2025-05-09 LAB
ATRIAL RATE: 62 BPM
P AXIS: 71 DEGREES
P OFFSET: 177 MS
P ONSET: 127 MS
PR INTERVAL: 170 MS
Q ONSET: 212 MS
QRS COUNT: 10 BEATS
QRS DURATION: 90 MS
QT INTERVAL: 426 MS
QTC CALCULATION(BAZETT): 432 MS
QTC FREDERICIA: 430 MS
R AXIS: -51 DEGREES
T AXIS: 26 DEGREES
T OFFSET: 425 MS
VENTRICULAR RATE: 62 BPM

## 2025-05-09 PROCEDURE — 38525 BIOPSY/REMOVAL LYMPH NODES: CPT | Performed by: PHYSICIAN ASSISTANT

## 2025-05-09 PROCEDURE — 7100000001 HC RECOVERY ROOM TIME - INITIAL BASE CHARGE: Performed by: SURGERY

## 2025-05-09 PROCEDURE — 7100000009 HC PHASE TWO TIME - INITIAL BASE CHARGE: Performed by: SURGERY

## 2025-05-09 PROCEDURE — 7100000002 HC RECOVERY ROOM TIME - EACH INCREMENTAL 1 MINUTE: Performed by: SURGERY

## 2025-05-09 PROCEDURE — 3600000009 HC OR TIME - EACH INCREMENTAL 1 MINUTE - PROCEDURE LEVEL FOUR: Performed by: SURGERY

## 2025-05-09 PROCEDURE — 38525 BIOPSY/REMOVAL LYMPH NODES: CPT | Performed by: SURGERY

## 2025-05-09 PROCEDURE — 88342 IMHCHEM/IMCYTCHM 1ST ANTB: CPT | Performed by: PATHOLOGY

## 2025-05-09 PROCEDURE — 96372 THER/PROPH/DIAG INJ SC/IM: CPT | Mod: 59 | Performed by: SURGERY

## 2025-05-09 PROCEDURE — 3600000004 HC OR TIME - INITIAL BASE CHARGE - PROCEDURE LEVEL FOUR: Performed by: SURGERY

## 2025-05-09 PROCEDURE — 3430000001 HC RX 343 DIAGNOSTIC RADIOPHARMACEUTICALS: Mod: JZ | Performed by: SURGERY

## 2025-05-09 PROCEDURE — 38900 IO MAP OF SENT LYMPH NODE: CPT | Performed by: SURGERY

## 2025-05-09 PROCEDURE — 76098 X-RAY EXAM SURGICAL SPECIMEN: CPT

## 2025-05-09 PROCEDURE — 2500000004 HC RX 250 GENERAL PHARMACY W/ HCPCS (ALT 636 FOR OP/ED)

## 2025-05-09 PROCEDURE — 2500000004 HC RX 250 GENERAL PHARMACY W/ HCPCS (ALT 636 FOR OP/ED): Mod: JZ | Performed by: ANESTHESIOLOGY

## 2025-05-09 PROCEDURE — 2720000007 HC OR 272 NO HCPCS: Performed by: SURGERY

## 2025-05-09 PROCEDURE — A9520 TC99 TILMANOCEPT DIAG 0.5MCI: HCPCS | Mod: JZ | Performed by: SURGERY

## 2025-05-09 PROCEDURE — 38792 RA TRACER ID OF SENTINL NODE: CPT

## 2025-05-09 PROCEDURE — 3700000001 HC GENERAL ANESTHESIA TIME - INITIAL BASE CHARGE: Performed by: SURGERY

## 2025-05-09 PROCEDURE — 88307 TISSUE EXAM BY PATHOLOGIST: CPT | Mod: TC,TRILAB | Performed by: SURGERY

## 2025-05-09 PROCEDURE — 2500000004 HC RX 250 GENERAL PHARMACY W/ HCPCS (ALT 636 FOR OP/ED): Performed by: SURGERY

## 2025-05-09 PROCEDURE — 2500000001 HC RX 250 WO HCPCS SELF ADMINISTERED DRUGS (ALT 637 FOR MEDICARE OP): Performed by: ANESTHESIOLOGY

## 2025-05-09 PROCEDURE — 19301 PARTIAL MASTECTOMY: CPT | Performed by: PHYSICIAN ASSISTANT

## 2025-05-09 PROCEDURE — 7100000010 HC PHASE TWO TIME - EACH INCREMENTAL 1 MINUTE: Performed by: SURGERY

## 2025-05-09 PROCEDURE — 88307 TISSUE EXAM BY PATHOLOGIST: CPT | Performed by: PATHOLOGY

## 2025-05-09 PROCEDURE — A4649 SURGICAL SUPPLIES: HCPCS | Performed by: SURGERY

## 2025-05-09 PROCEDURE — 3700000002 HC GENERAL ANESTHESIA TIME - EACH INCREMENTAL 1 MINUTE: Performed by: SURGERY

## 2025-05-09 PROCEDURE — 93010 ELECTROCARDIOGRAM REPORT: CPT | Performed by: INTERNAL MEDICINE

## 2025-05-09 PROCEDURE — 93005 ELECTROCARDIOGRAM TRACING: CPT | Mod: 59

## 2025-05-09 PROCEDURE — RXMED WILLOW AMBULATORY MEDICATION CHARGE

## 2025-05-09 PROCEDURE — 19301 PARTIAL MASTECTOMY: CPT | Performed by: SURGERY

## 2025-05-09 RX ORDER — ONDANSETRON HYDROCHLORIDE 2 MG/ML
INJECTION, SOLUTION INTRAVENOUS AS NEEDED
Status: DISCONTINUED | OUTPATIENT
Start: 2025-05-09 | End: 2025-05-09

## 2025-05-09 RX ORDER — MEPERIDINE HYDROCHLORIDE 25 MG/ML
12.5 INJECTION INTRAMUSCULAR; INTRAVENOUS; SUBCUTANEOUS EVERY 10 MIN PRN
Status: DISCONTINUED | OUTPATIENT
Start: 2025-05-09 | End: 2025-05-09 | Stop reason: HOSPADM

## 2025-05-09 RX ORDER — ONDANSETRON 4 MG/1
4 TABLET, ORALLY DISINTEGRATING ORAL EVERY 8 HOURS PRN
Qty: 20 TABLET | Refills: 0 | Status: SHIPPED | OUTPATIENT
Start: 2025-05-09

## 2025-05-09 RX ORDER — HYDROCODONE BITARTRATE AND ACETAMINOPHEN 5; 325 MG/1; MG/1
1 TABLET ORAL EVERY 6 HOURS PRN
Qty: 20 TABLET | Refills: 0 | Status: SHIPPED | OUTPATIENT
Start: 2025-05-09

## 2025-05-09 RX ORDER — BUPIVACAINE HYDROCHLORIDE 5 MG/ML
INJECTION, SOLUTION PERINEURAL AS NEEDED
Status: DISCONTINUED | OUTPATIENT
Start: 2025-05-09 | End: 2025-05-09 | Stop reason: HOSPADM

## 2025-05-09 RX ORDER — PROPOFOL 10 MG/ML
INJECTION, EMULSION INTRAVENOUS AS NEEDED
Status: DISCONTINUED | OUTPATIENT
Start: 2025-05-09 | End: 2025-05-09

## 2025-05-09 RX ORDER — ONDANSETRON 4 MG/1
4 TABLET, ORALLY DISINTEGRATING ORAL ONCE
Status: DISCONTINUED | OUTPATIENT
Start: 2025-05-09 | End: 2025-05-09 | Stop reason: HOSPADM

## 2025-05-09 RX ORDER — ISOSULFAN BLUE 50 MG/5ML
INJECTION, SOLUTION SUBCUTANEOUS AS NEEDED
Status: DISCONTINUED | OUTPATIENT
Start: 2025-05-09 | End: 2025-05-09 | Stop reason: HOSPADM

## 2025-05-09 RX ORDER — ONDANSETRON HYDROCHLORIDE 2 MG/ML
4 INJECTION, SOLUTION INTRAVENOUS ONCE AS NEEDED
Status: COMPLETED | OUTPATIENT
Start: 2025-05-09 | End: 2025-05-09

## 2025-05-09 RX ORDER — CEFAZOLIN SODIUM 2 G/100ML
INJECTION, SOLUTION INTRAVENOUS AS NEEDED
Status: DISCONTINUED | OUTPATIENT
Start: 2025-05-09 | End: 2025-05-09

## 2025-05-09 RX ORDER — FENTANYL CITRATE 50 UG/ML
INJECTION, SOLUTION INTRAMUSCULAR; INTRAVENOUS AS NEEDED
Status: DISCONTINUED | OUTPATIENT
Start: 2025-05-09 | End: 2025-05-09

## 2025-05-09 RX ORDER — MIDAZOLAM HYDROCHLORIDE 1 MG/ML
1 INJECTION, SOLUTION INTRAMUSCULAR; INTRAVENOUS ONCE AS NEEDED
Status: COMPLETED | OUTPATIENT
Start: 2025-05-09 | End: 2025-05-09

## 2025-05-09 RX ORDER — ALBUTEROL SULFATE 0.83 MG/ML
2.5 SOLUTION RESPIRATORY (INHALATION) ONCE
Status: DISCONTINUED | OUTPATIENT
Start: 2025-05-09 | End: 2025-05-09 | Stop reason: HOSPADM

## 2025-05-09 RX ORDER — HYDROMORPHONE HYDROCHLORIDE 0.2 MG/ML
0.2 INJECTION INTRAMUSCULAR; INTRAVENOUS; SUBCUTANEOUS EVERY 5 MIN PRN
Status: DISCONTINUED | OUTPATIENT
Start: 2025-05-09 | End: 2025-05-09 | Stop reason: HOSPADM

## 2025-05-09 RX ORDER — LIDOCAINE HYDROCHLORIDE 20 MG/ML
INJECTION, SOLUTION EPIDURAL; INFILTRATION; INTRACAUDAL; PERINEURAL AS NEEDED
Status: DISCONTINUED | OUTPATIENT
Start: 2025-05-09 | End: 2025-05-09

## 2025-05-09 RX ORDER — SODIUM CHLORIDE, SODIUM LACTATE, POTASSIUM CHLORIDE, CALCIUM CHLORIDE 600; 310; 30; 20 MG/100ML; MG/100ML; MG/100ML; MG/100ML
100 INJECTION, SOLUTION INTRAVENOUS CONTINUOUS
Status: DISCONTINUED | OUTPATIENT
Start: 2025-05-09 | End: 2025-05-09 | Stop reason: HOSPADM

## 2025-05-09 RX ORDER — FAMOTIDINE 20 MG/1
20 TABLET, FILM COATED ORAL
Status: COMPLETED | OUTPATIENT
Start: 2025-05-09 | End: 2025-05-09

## 2025-05-09 RX ORDER — MIDAZOLAM HYDROCHLORIDE 1 MG/ML
INJECTION, SOLUTION INTRAMUSCULAR; INTRAVENOUS AS NEEDED
Status: DISCONTINUED | OUTPATIENT
Start: 2025-05-09 | End: 2025-05-09

## 2025-05-09 RX ORDER — FENTANYL CITRATE 50 UG/ML
50 INJECTION, SOLUTION INTRAMUSCULAR; INTRAVENOUS EVERY 5 MIN PRN
Status: DISCONTINUED | OUTPATIENT
Start: 2025-05-09 | End: 2025-05-09 | Stop reason: HOSPADM

## 2025-05-09 RX ORDER — LIDOCAINE HYDROCHLORIDE 10 MG/ML
0.1 INJECTION, SOLUTION INFILTRATION; PERINEURAL ONCE
Status: DISCONTINUED | OUTPATIENT
Start: 2025-05-09 | End: 2025-05-09 | Stop reason: HOSPADM

## 2025-05-09 RX ORDER — LIDOCAINE HYDROCHLORIDE AND EPINEPHRINE 10; 10 UG/ML; MG/ML
INJECTION, SOLUTION INFILTRATION; PERINEURAL AS NEEDED
Status: DISCONTINUED | OUTPATIENT
Start: 2025-05-09 | End: 2025-05-09 | Stop reason: HOSPADM

## 2025-05-09 RX ORDER — KETOROLAC TROMETHAMINE 30 MG/ML
INJECTION, SOLUTION INTRAMUSCULAR; INTRAVENOUS AS NEEDED
Status: DISCONTINUED | OUTPATIENT
Start: 2025-05-09 | End: 2025-05-09

## 2025-05-09 RX ADMIN — Medication 0.5 MILLICURIE: at 11:51

## 2025-05-09 RX ADMIN — FENTANYL CITRATE 50 MCG: 50 INJECTION, SOLUTION INTRAMUSCULAR; INTRAVENOUS at 11:44

## 2025-05-09 RX ADMIN — SODIUM CHLORIDE, POTASSIUM CHLORIDE, SODIUM LACTATE AND CALCIUM CHLORIDE: 600; 310; 30; 20 INJECTION, SOLUTION INTRAVENOUS at 11:34

## 2025-05-09 RX ADMIN — MIDAZOLAM 1 MG: 1 INJECTION INTRAMUSCULAR; INTRAVENOUS at 11:34

## 2025-05-09 RX ADMIN — PROPOFOL 150 MG: 10 INJECTION, EMULSION INTRAVENOUS at 11:44

## 2025-05-09 RX ADMIN — FENTANYL CITRATE 50 MCG: 50 INJECTION INTRAMUSCULAR; INTRAVENOUS at 13:47

## 2025-05-09 RX ADMIN — HYDROMORPHONE HYDROCHLORIDE 0.2 MG: 0.2 INJECTION, SOLUTION INTRAMUSCULAR; INTRAVENOUS; SUBCUTANEOUS at 14:06

## 2025-05-09 RX ADMIN — ONDANSETRON 4 MG: 2 INJECTION, SOLUTION INTRAMUSCULAR; INTRAVENOUS at 14:24

## 2025-05-09 RX ADMIN — FENTANYL CITRATE 25 MCG: 50 INJECTION, SOLUTION INTRAMUSCULAR; INTRAVENOUS at 12:31

## 2025-05-09 RX ADMIN — LIDOCAINE HYDROCHLORIDE 100 MG: 20 INJECTION, SOLUTION EPIDURAL; INFILTRATION; INTRACAUDAL; PERINEURAL at 11:44

## 2025-05-09 RX ADMIN — CEFAZOLIN SODIUM 2 G: 2 INJECTION, SOLUTION INTRAVENOUS at 11:47

## 2025-05-09 RX ADMIN — FENTANYL CITRATE 25 MCG: 50 INJECTION, SOLUTION INTRAMUSCULAR; INTRAVENOUS at 12:37

## 2025-05-09 RX ADMIN — FENTANYL CITRATE 25 MCG: 50 INJECTION, SOLUTION INTRAMUSCULAR; INTRAVENOUS at 11:57

## 2025-05-09 RX ADMIN — FENTANYL CITRATE 25 MCG: 50 INJECTION, SOLUTION INTRAMUSCULAR; INTRAVENOUS at 12:51

## 2025-05-09 RX ADMIN — PROPOFOL 50 MG: 10 INJECTION, EMULSION INTRAVENOUS at 12:02

## 2025-05-09 RX ADMIN — FENTANYL CITRATE 25 MCG: 50 INJECTION, SOLUTION INTRAMUSCULAR; INTRAVENOUS at 12:02

## 2025-05-09 RX ADMIN — DEXAMETHASONE SODIUM PHOSPHATE 4 MG: 4 INJECTION, SOLUTION INTRAMUSCULAR; INTRAVENOUS at 11:50

## 2025-05-09 RX ADMIN — MIDAZOLAM HYDROCHLORIDE 1 MG: 1 INJECTION, SOLUTION INTRAMUSCULAR; INTRAVENOUS at 13:40

## 2025-05-09 RX ADMIN — FENTANYL CITRATE 50 MCG: 50 INJECTION INTRAMUSCULAR; INTRAVENOUS at 13:37

## 2025-05-09 RX ADMIN — FAMOTIDINE 20 MG: 20 TABLET, FILM COATED ORAL at 16:42

## 2025-05-09 RX ADMIN — FENTANYL CITRATE 25 MCG: 50 INJECTION, SOLUTION INTRAMUSCULAR; INTRAVENOUS at 12:17

## 2025-05-09 RX ADMIN — KETOROLAC TROMETHAMINE 30 MG: 30 INJECTION, SOLUTION INTRAMUSCULAR at 12:56

## 2025-05-09 RX ADMIN — ONDANSETRON 4 MG: 2 INJECTION, SOLUTION INTRAMUSCULAR; INTRAVENOUS at 12:20

## 2025-05-09 ASSESSMENT — PAIN SCALES - GENERAL
PAINLEVEL_OUTOF10: 0 - NO PAIN
PAINLEVEL_OUTOF10: 6
PAINLEVEL_OUTOF10: 0 - NO PAIN
PAINLEVEL_OUTOF10: 8
PAINLEVEL_OUTOF10: 1
PAINLEVEL_OUTOF10: 0 - NO PAIN
PAINLEVEL_OUTOF10: 0 - NO PAIN
PAINLEVEL_OUTOF10: 3

## 2025-05-09 ASSESSMENT — PAIN - FUNCTIONAL ASSESSMENT
PAIN_FUNCTIONAL_ASSESSMENT: 0-10

## 2025-05-09 ASSESSMENT — COLUMBIA-SUICIDE SEVERITY RATING SCALE - C-SSRS
1. IN THE PAST MONTH, HAVE YOU WISHED YOU WERE DEAD OR WISHED YOU COULD GO TO SLEEP AND NOT WAKE UP?: NO
2. HAVE YOU ACTUALLY HAD ANY THOUGHTS OF KILLING YOURSELF?: NO
6. HAVE YOU EVER DONE ANYTHING, STARTED TO DO ANYTHING, OR PREPARED TO DO ANYTHING TO END YOUR LIFE?: NO

## 2025-05-09 ASSESSMENT — PAIN DESCRIPTION - DESCRIPTORS
DESCRIPTORS: ACHING
DESCRIPTORS: ACHING
DESCRIPTORS: DULL
DESCRIPTORS: ACHING

## 2025-05-09 NOTE — OP NOTE
Magseed Localization Lumpectomy (L), [place] Node Biopsy (L) Operative Note     Date: 2025  OR Location: [facility]    Name: Patsy Menendez YOB: 1955, Age: 70 y.o., MRN: [id number], Sex: female    Diagnosis  Pre-op Diagnosis      * Infiltrating ductal carcinoma of left breast [C50.912] Post-op Diagnosis     * Infiltrating ductal carcinoma of left breast [C50.912]     Procedures  Magseed Localization Lumpectomy  60609 - MN MASTECTOMY PARTIAL    [place] Node Biopsy  47897 - MN BX/EXC LYMPH NODE OPEN DEEP AXILLARY NODE    MN INTRAOP SENTINEL LYMPH NODE ID W/DYE INJECTION [22102]  Surgeons      * Bonnie Martino - Primary    Resident/Fellow/Other Assistant:  Surgeons and Role:  * No surgeons found with a matching role *    Staff:   Khadijahulator: Chris Burns Person: Breann    Anesthesia Staff: Anesthesiologist: Doyle Porter DO  C-AA: CARINA Hays    Procedure Summary  Anesthesia: General  ASA: II  Estimated Blood Loss: 2mL  Intra-op Medications:   Administrations occurring from 1145 to 1350 on 25:   Medication Name Total Dose   BUPivacaine HCl (Marcaine) 0.5 % (5 mg/mL) injection 5 mL   lidocaine-epinephrine (Xylocaine W/EPI) 1 %-1:100,000 injection 5 mL   isosulfan blue (Lumphazurin) 1 % injection 3 mL   ceFAZolin (Ancef) IV 2 g in 100 mL dextrose (iso) - premix 2 g   dexAMETHasone (Decadron) 4 mg/mL IV Syringe 2 mL 4 mg   fentaNYL (Sublimaze) injection 50 mcg/mL 125 mcg   ketorolac (Toradol) injection 30 mg 30 mg   ondansetron (Zofran) 2 mg/mL injection 4 mg   propofol (Diprivan) injection 10 mg/mL 50 mg              Anesthesia Record               Intraprocedure I/O Totals          Intake    ceFAZolin 2 gram/100 mL 100.00 mL    Total Intake 100 mL          Specimen:   ID Type Source Tests Collected by Time   1 : left sentinal lymph node Tissue SENTINEL LYMPH NODE BREAST LEFT SURGICAL PATHOLOGY EXAM Bonnie Martnio MD 2025 1213   2 : left breast magseed lumpectomy. short=superior,   long=medial, blue=anterior Tissue BREAST LUMPECTOMY LEFT SURGICAL PATHOLOGY EXAM Bonnie Martino MD 5/9/2025 1236                 Drains and/or Catheters: * None in log *    Tourniquet Times:         Implants:     Findings:    A confirmation of excision of the affected area.  Magseed slightly eccentric but good margin.  1 sentinel node encountered with technetium and Lymphazurin no other lymph nodes with technetium identified in the axilla or with Lymphazurin    Indications: Patsy Menendez is an 70 y.o. female who is having surgery for Infiltrating ductal carcinoma of left breast (Multi) [C50.912].     The patient was seen in the preoperative area. The risks, benefits, complications, treatment options, non-operative alternatives, expected recovery and outcomes were discussed with the patient. The possibilities of reaction to medication, pulmonary aspiration, injury to surrounding structures, bleeding, recurrent infection, the need for additional procedures, failure to diagnose a condition, and creating a complication requiring transfusion or operation were discussed with the patient. The patient concurred with the proposed plan, giving informed consent.  The site of surgery was properly noted/marked if necessary per policy. The patient has been actively warmed in preoperative area. Preoperative antibiotics have been ordered and given within 1 hours of incision. Venous thrombosis prophylaxis have been ordered including bilateral sequential compression devices    Procedure Details: Patient was brought to Room in the supine position after undergoing Magseed injection into the left breast prior to her surgery date..   General anesthesia was obtained with LMA. Breast was prepped and draped in a sterile fashion.     Technetium was injected into the left breast at the nipple areolar epithelial junction at the 3:00 and 9 o'clock position.  This was in the dermis.  In addition Lymphazurin was injected into the breast in  the upper outer quadrant at the nipple areolar epithelial junction.  Massaging was done to the breast.  Left breast and axilla was then prepped and draped in a sterile fashion.    Operation began with sentinel node biopsy. The sentinel node probe was placed into the axilla and where there were high counts marking pen was used to delineate the line of incision. 1% lidocaine mixed with 25% Marcaine with epi was infiltrated into the dermis. A 15 scalpel was used to make a 2-1/2 centimeter incision in the skin. Underlying subcutaneous tissue  using electrocautery. Axillary fascia was opened with the Harmonic thunderbeat scalpel. Probe was placed into the axillary fatty tissue and where there were high counts the Mireille and right angle was used to grasp the tissue and dissect the lymph node from the surrounding tissue.  This lymph node also had Lymphazurin.  After the lymph node was removed it was placed on the probe outside the field and had counts of over 1000.   The probe was placed back into the axilla and there were no other high counts.  Further exploration revealed no evidence of Lymphazurin in nodes in the axilla either . the axilla was irrigated with water. The axillary fascia was closed with a 2-0 Vicryl stitch. The dermis was closed with interrupted 3-0 Vicryl followed by a running 4-0 Monocryl for the skin.  Operation then continued with lumpectomy. Marking pen was used to delineate the line of incision in the outer  quadrant of the left  breast where the magseed probe had high counts in a radial fashion. Local was infiltrated in the tissue. Fifteen blade scalpel was used to make a 4 centimeter incision in the skin.  The tissue around the Magseed was excised using electrocautery.  The specimen was constantly checked during the dissection to ensure the Magseed was centrally located.  After it was excised it was marked appropriately with a short stitch superior and long stitch medial and a blue stitch  anterior. It was sent to the Radiology Department who confirmed the presence of the affected area. The lumpectomy cavity was then irrigated with water. Hemostasis maintained with electrocautery.  3-0 Vicryl was used to bring the deep tissues together.  Incision was then closed using interrupted 3-0 Vicryl followed by a running 4-0 Monocryl. Steri-Strips and sterile dressings were applied patient tolerated procedure well LMA was removed in the operating room and she was transferred to recovery room with rails up all counts were good     Evidence of Infection: No   Complications:  None; patient tolerated the procedure well.    Disposition: PACU - hemodynamically stable.  Condition: stable             Task Performed by NICHOLAS First Assist or Physician Assistant:   Shin BLUM/NP, was necessary to assist on this case due to the nature of the case and difficulty. During the case he served as my assist by expposure      Additional Details:     Attending Attestation: I was present and scrubbed for the entire procedure.    Bonnie Martino  Phone Number: [phone]

## 2025-05-09 NOTE — ANESTHESIA PREPROCEDURE EVALUATION
Patient: Patsy Menendez    Procedure Information       Date/Time: 05/09/25 1145    Procedures:       Magseed Localization Lumpectomy (Left: Breast)      [place] Node Biopsy (Left: Breast) - NM Injection    Location: [facility] / [facility]    Surgeons: Bonnie Martino MD            Relevant Problems   Cardiac   (+) Chest pain   (+) Other chest pain      Neuro   (+) Lumbar radiculopathy   (+) Sciatica      GI   (+) Gastroesophageal reflux disease      Musculoskeletal   (+) Degeneration of intervertebral disc of lumbar region   (+) Spinal stenosis, lumbar region, without neurogenic claudication      ID   (+) Acute upper respiratory infection, unspecified      GYN   (+) Infiltrating ductal carcinoma of left breast   (+) Malignant neoplasm of overlapping sites of left female breast       Clinical information reviewed:   Tobacco  Allergies  Meds  Problems  Med Hx  Surg Hx   Fam Hx          NPO Detail:  No data recorded     Physical Exam    Airway  Mallampati: II  TM distance: >3 FB     Cardiovascular    Dental    Pulmonary    Abdominal            Anesthesia Plan    History of general anesthesia?: yes  History of complications of general anesthesia?: no    ASA 2     general     intravenous induction   Anesthetic plan and risks discussed with patient.

## 2025-05-09 NOTE — ANESTHESIA POSTPROCEDURE EVALUATION
Patient: Patsy Menendez    Procedure Summary       Date: 05/09/25 Room / Location: [facility]    Anesthesia Start: 1135 Anesthesia Stop: 1312    Procedures:       Magseed Localization Lumpectomy (Left: Breast)      [place] Node Biopsy (Left: Breast) Diagnosis:       Infiltrating ductal carcinoma of left breast      (Infiltrating ductal carcinoma of left breast (Multi) [C50.912])    Surgeons: Bonnie Martino MD Responsible Provider: Doyle Porter DO    Anesthesia Type: general ASA Status: 2            Anesthesia Type: general    Vitals Value Taken Time   /55 05/09/25 13:26   Temp  05/09/25 13:29   Pulse 80 05/09/25 13:28   Resp 15 05/09/25 13:28   SpO2 95 % 05/09/25 13:28   Vitals shown include unfiled device data.    Anesthesia Post Evaluation    Patient location during evaluation: bedside  Patient participation: complete - patient participated  Level of consciousness: awake  Pain management: adequate  Airway patency: patent  Cardiovascular status: acceptable  Respiratory status: acceptable  Hydration status: acceptable  Postoperative Nausea and Vomiting: none        There were no known notable events for this encounter.

## 2025-05-09 NOTE — ANESTHESIA PROCEDURE NOTES
Airway  Date/Time: 5/9/2025 11:46 AM  Reason: elective    Airway not difficult    Staffing  Performed: CAA   Authorized by: Doyle Porter DO    Performed by: CARINA Hays  Patient location during procedure: OR    Patient Condition  Indications for airway management: anesthesia  Sedation level: deep     Final Airway Details   Preoxygenated: yes  Final airway type: supraglottic airway  Successful airway: classic  Size: 4  Number of attempts at approach: 1

## 2025-05-14 ENCOUNTER — HOSPITAL ENCOUNTER (OUTPATIENT)
Facility: HOSPITAL | Age: 70
Setting detail: OUTPATIENT SURGERY
Discharge: HOME | End: 2025-05-14
Attending: INTERNAL MEDICINE | Admitting: INTERNAL MEDICINE
Payer: MEDICARE

## 2025-05-14 VITALS
OXYGEN SATURATION: 94 % | DIASTOLIC BLOOD PRESSURE: 65 MMHG | RESPIRATION RATE: 20 BRPM | HEART RATE: 72 BPM | SYSTOLIC BLOOD PRESSURE: 127 MMHG

## 2025-05-14 DIAGNOSIS — I20.9 ANGINA PECTORIS: ICD-10-CM

## 2025-05-14 DIAGNOSIS — I25.119 ATHEROSCLEROTIC HEART DISEASE OF NATIVE CORONARY ARTERY WITH UNSPECIFIED ANGINA PECTORIS: ICD-10-CM

## 2025-05-14 DIAGNOSIS — R93.1 ABNORMAL FINDINGS DIAGNOSTIC IMAGING OF HEART AND CORONARY CIRCULATION: ICD-10-CM

## 2025-05-14 PROCEDURE — 2500000005 HC RX 250 GENERAL PHARMACY W/O HCPCS: Performed by: INTERNAL MEDICINE

## 2025-05-14 PROCEDURE — 2720000007 HC OR 272 NO HCPCS: Performed by: INTERNAL MEDICINE

## 2025-05-14 PROCEDURE — C1894 INTRO/SHEATH, NON-LASER: HCPCS | Performed by: INTERNAL MEDICINE

## 2025-05-14 PROCEDURE — G0269 OCCLUSIVE DEVICE IN VEIN ART: HCPCS | Mod: 59 | Performed by: INTERNAL MEDICINE

## 2025-05-14 PROCEDURE — C1760 CLOSURE DEV, VASC: HCPCS | Performed by: INTERNAL MEDICINE

## 2025-05-14 PROCEDURE — 2780000003 HC OR 278 NO HCPCS: Performed by: INTERNAL MEDICINE

## 2025-05-14 PROCEDURE — 93458 L HRT ARTERY/VENTRICLE ANGIO: CPT | Performed by: INTERNAL MEDICINE

## 2025-05-14 PROCEDURE — 7100000010 HC PHASE TWO TIME - EACH INCREMENTAL 1 MINUTE: Performed by: INTERNAL MEDICINE

## 2025-05-14 PROCEDURE — 99152 MOD SED SAME PHYS/QHP 5/>YRS: CPT | Performed by: INTERNAL MEDICINE

## 2025-05-14 PROCEDURE — 7100000009 HC PHASE TWO TIME - INITIAL BASE CHARGE: Performed by: INTERNAL MEDICINE

## 2025-05-14 PROCEDURE — 2550000001 HC RX 255 CONTRASTS: Performed by: INTERNAL MEDICINE

## 2025-05-14 PROCEDURE — 2500000004 HC RX 250 GENERAL PHARMACY W/ HCPCS (ALT 636 FOR OP/ED): Performed by: INTERNAL MEDICINE

## 2025-05-14 RX ORDER — LIDOCAINE HYDROCHLORIDE 20 MG/ML
INJECTION, SOLUTION INFILTRATION; PERINEURAL AS NEEDED
Status: DISCONTINUED | OUTPATIENT
Start: 2025-05-14 | End: 2025-05-14 | Stop reason: HOSPADM

## 2025-05-14 RX ORDER — MIDAZOLAM HYDROCHLORIDE 1 MG/ML
INJECTION, SOLUTION INTRAMUSCULAR; INTRAVENOUS AS NEEDED
Status: DISCONTINUED | OUTPATIENT
Start: 2025-05-14 | End: 2025-05-14 | Stop reason: HOSPADM

## 2025-05-14 ASSESSMENT — COLUMBIA-SUICIDE SEVERITY RATING SCALE - C-SSRS
2. HAVE YOU ACTUALLY HAD ANY THOUGHTS OF KILLING YOURSELF?: NO
1. IN THE PAST MONTH, HAVE YOU WISHED YOU WERE DEAD OR WISHED YOU COULD GO TO SLEEP AND NOT WAKE UP?: NO
6. HAVE YOU EVER DONE ANYTHING, STARTED TO DO ANYTHING, OR PREPARED TO DO ANYTHING TO END YOUR LIFE?: NO

## 2025-05-14 ASSESSMENT — PAIN SCALES - GENERAL: PAINLEVEL_OUTOF10: 0 - NO PAIN

## 2025-05-14 ASSESSMENT — PAIN - FUNCTIONAL ASSESSMENT: PAIN_FUNCTIONAL_ASSESSMENT: 0-10

## 2025-05-14 NOTE — POST-PROCEDURE NOTE
Physician Transition of Care Summary  Invasive Cardiovascular Lab    Procedure Date: 5/14/2025  Attending:    Aravind Rod - Primary  Resident/Fellow/Other Assistant: Surgeons and Role:  * No surgeons found with a matching role *    Indications:   Pre-op Diagnosis      * Abnormal findings diagnostic imaging of heart and coronary circulation [R93.1]     * Angina pectoris [I20.9]    Post-procedure diagnosis:   Post-op Diagnosis     * Abnormal findings diagnostic imaging of heart and coronary circulation [R93.1]     * Angina pectoris [I20.9]    Procedure(s):   [facility], With   32914 - [place] CATH PLMT L HRT & ARTS W/NJX & ANGIO IMG S&I        Procedure Findings:   NORMAL CORONARIES NORMAL LVEDP    Description of the Procedure:   [facility]    Complications:   NONE    Stents/Implants:   Implants       No implant documentation for this case.            Anticoagulation/Antiplatelet Plan:   NA    Estimated Blood Loss:   5 mL    Anesthesia: Moderate Sedation Anesthesia Staff: No anesthesia staff entered.    Any Specimen(s) Removed:   No specimens collected during this procedure.    Disposition:   HOME      Electronically signed by: Chuy Rod MD, 5/14/2025 7:54 AM

## 2025-05-14 NOTE — H&P
History Of Present Illness  Patsy Menendez is a 70 y.o. female presenting with chest pain abnormal stress lateral ischemia.     Past Medical History  Medical History[1]    Surgical History  Surgical History[2]     Social History  She reports that she quit smoking about 53 years ago. Her smoking use included cigarettes. She started smoking about 56 years ago. She has a 0.8 pack-year smoking history. She has been exposed to tobacco smoke. She has never used smokeless tobacco. She reports current alcohol use. She reports that she does not use drugs.    Family History  Family History[3]     Allergies  Phenylephrine, Pseudoephedrine hcl, Chicken derived, Loratadine, and Morphine    Review of Systems   Comprehensive 10-point review of systems negative otherwise as noted above in HPI    Physical Exam   Constitutional: Well developed, awake/alert/oriented x3, no distress, alert and cooperative  Eyes: PERRL, EOMI, clear sclera  ENMT: mucous membranes moist, no apparent injury, no lesions seen  Head/Neck: Neck supple, no apparent injury, thyroid without mass or tenderness, No JVD, trachea midline, no bruits  Respiratory/Thorax: Patent airways, CTAB, normal breath sounds with good chest expansion, thorax symmetric  Cardiovascular: Regular, rate and rhythm, no murmurs, 2+ equal pulses of the extremities, normal S 1and S 2  Gastrointestinal: Nondistended, soft, non-tender, no rebound tenderness or guarding, no masses palpable, no organomegaly, +BS, no bruits  Musculoskeletal: ROM intact, no joint swelling, normal strength  Extremities: normal extremities, no cyanosis edema, contusions or wounds, no clubbing  Neurological: alert and oriented x3, intact senses, motor, response and reflexes, normal strength  Lymphatic: No significant lymphadenopathy  Psychological: Appropriate mood and behavior  Skin: Warm and dry, no lesions, no rashes    Last Recorded Vitals  Blood pressure 136/89, pulse 76, resp. rate 18, SpO2  96%.    Relevant Results        See office notes for details     Assessment & Plan      Consent obtained for coronary angiography    I spent 15 minutes in the professional and overall care of this patient.      Chuy Rod MD         [1]   Past Medical History:  Diagnosis Date    Adverse effect of anesthesia     CONCERNED ABOUT PRESSURE ON FACE WHILE UNDER. HS NASAL FRACTURE 5 YEARS AGO.    Carpal tunnel syndrome    [2]   Past Surgical History:  Procedure Laterality Date    APPENDECTOMY      BREAST BIOPSY Left 2025    BREAST SURGERY Bilateral     bilateral breast cyst surgery for fibroids    CARPAL TUNNEL RELEASE       SECTION, LOW TRANSVERSE      x3 in the 70s    CHOLECYSTECTOMY      laparoscopic    HYSTERECTOMY      bilateral tubes removed    LAPAROTOMY OOPHERECTOMY Bilateral     MANDIBLE SURGERY      TMJ implant   [3]   Family History  Problem Relation Name Age of Onset    Cancer Brother      Brain cancer Mother's Sister      Stroke Maternal Grandmother

## 2025-05-14 NOTE — SIGNIFICANT EVENT
Dr Vera at bedside to talk to patient and  about results of [facility]

## 2025-05-15 NOTE — SIGNIFICANT EVENT
Cath Lab Procedure Call Back  Procedure Date: ____5/14___________   Procedure Performed:____LHC________________  Physician:____Krishnan___________  Spoke with:_____________________________  Date/Time Contacted: 1st attempt: ____5/15_____ __14_:_04___ 2nd attempt: _________ ___:____  Phone#:_______________ Unable to reach/Left message:_____X_______  Access Site: Pain______Bleeding______Bruised______Infection______WNL______  Dressing Removal Date:______________ Anticoagulation Post Intervention_________  Satisfied with Care:________________ D/C Instructions adequate_______________  Follow Up Appointment:_____________________ Length of Call:__________________  Comments:______________________________________________________________________________________________________________________________________________

## 2025-05-16 NOTE — PROGRESS NOTES
Subjective   Patient ID: Patsy Menendez is a 70 y.o. female who presents for S/P Left Breast Lumpectomy with Magseed Localization & SNB 5/9/2025.  HPI  Patient returns to clinic and presents for S/P Left Breast Lumpectomy with Magseed Localization & SNB 5/9/2025.  Patient was last seen in clinic on 4/4/2025 for discuss right stereotatic brest biopsy results, bx on 3/17 .   A confirmation of excision of the affected area. Magseed slightly eccentric but good margin. 1 sentinel node encountered with technetium and Lymphazurin no other lymph nodes with technetium identified in the axilla or with Lymphazurin   Surgical pathology was collected and finalized.    FINAL DIAGNOSIS   A. Left sentinel lymph node, excision:  -- One lymph node, negative for carcinoma (0/1), see note.      Note: Multiple deeper levels were reviewed. Immunohistochemical stains for cytokeratin AE1/3 are negative.          B. Left breast, magseed localized lumpectomy:  -- Invasive ductal carcinoma and ductal carcinoma in situ, see synoptic report.   -- Changes consistent with site of previous biopsy.      By the signature on this report, the individual or group listed as making the Final Interpretation/Diagnosis certifies that they have reviewed this case.    Case Summary Report   INVASIVE CARCINOMA OF THE BREAST: Resection   8th Edition - Protocol posted: 6/19/2024INVASIVE CARCINOMA OF THE BREAST: RESECTION - All Specimens  SPECIMEN   Procedure  Excision (less than total mastectomy)   Specimen Laterality  Left   TUMOR   Tumor Site  Clock position     12 o'clock   Tumor Site  Distance from nipple (Centimeters): 3 cm   Histologic Type  Invasive carcinoma of no special type (ductal)   Histologic Grade (Jeremy Histologic Score)     Glandular (Acinar) / Tubular Differentiation  Score 3   Nuclear Pleomorphism  Score 2   Mitotic Rate  Score 3   Overall Grade  Grade 3 (scores of 8 or 9)   Tumor Size  Greatest dimension of largest invasive focus  (Millimeters): 22 mm   Tumor Focality  Single focus of invasive carcinoma   Ductal Carcinoma In Situ (DCIS)  Present     Negative for extensive intraductal component (EIC)   Architectural Patterns  Cribriform     Solid   Nuclear Grade  Grade II (intermediate)   Necrosis  Present, focal (small foci or single cell necrosis)   Lymphatic and / or Vascular Invasion  Cannot be determined: Indeterminant     Dermal Lymphatic and / or Vascular Invasion  No skin present   Microcalcifications  Present in DCIS     Present in non-neoplastic tissue   Treatment Effect in the Breast  No known presurgical therapy   MARGINS   Margin Status for Invasive Carcinoma  All margins negative for invasive carcinoma   Distance from Invasive Carcinoma to Closest Margin  Greater than: 2 mm   Margin Status for DCIS  All margins negative for DCIS   Distance from DCIS to Closest Margin  DCIS is focally <1 mm from the medial and anterior/superior margins.  mm   REGIONAL LYMPH NODES   Regional Lymph Node Status  All regional lymph nodes negative for tumor   Total Number of Lymph Nodes Examined (sentinel and non-sentinel)  1   Number of [place] Nodes Examined  1   pTNM CLASSIFICATION (AJCC 8th Edition)   Reporting of pT, pN, and (when applicable) pM categories is based on information available to the pathologist at the time the report is issued. As per the AJCC (Chapter 1, 8th Ed.) it is the managing physician's responsibility to establish the final pathologic stage based upon all pertinent information, including but potentially not limited to this pathology report.   pT Category  pT2   pN Category  pN0   N Suffix  (sn)   SPECIAL STUDIES        Estrogen Receptor (ER) Status  Positive (greater than 10% of cells demonstrate nuclear positivity)   Percentage of Cells with Nuclear Positivity  %        Progesterone Receptor (PgR) Status  Positive   Percentage of Cells with Nuclear Positivity  %        HER2 (by immunohistochemistry)  Negative  (Score 1+)   Testing Performed on Case Number  Prior core biopsy [id number]   .      Block for Additional Biomarkers/Molecular Studies  [facility]   Tumor Block: B14     Social History:  Tobacco Use - Former  Do you use any recreational drugs - No  Alcohol Use - Yes   Caffeine Intake - yes  Working - Retired   Marital status -   Living with -  Spouse    Past Medical History:   Diagnosis Date    Adverse effect of anesthesia     CONCERNED ABOUT PRESSURE ON FACE WHILE UNDER. HS NASAL FRACTURE 5 YEARS AGO.    Carpal tunnel syndrome      Family History   Problem Relation Name Age of Onset    Cancer Brother      Brain cancer Mother's Sister      Stroke Maternal Grandmother       Past Surgical History:   Procedure Laterality Date    APPENDECTOMY      BREAST BIOPSY Left 2025    BREAST LUMPECTOMY W/ NEEDLE LOCALIZATION Left 2025    & SNB    BREAST SURGERY Bilateral     bilateral breast cyst surgery for fibroids    CARDIAC CATHETERIZATION N/A 2025    Procedure: [facility], With LV;  Surgeon: Chuy Rod MD;  Location: [facility] Cardiac Cath Lab;  Service: Cardiovascular;  Laterality: N/A;  25--730 am for [facility] [id number] for abn stress test R93.1 and angina I20.9  Medicare and Med Mut; no PA required    CARPAL TUNNEL RELEASE       SECTION, LOW TRANSVERSE      x3 in the     CHOLECYSTECTOMY      laparoscopic    HYSTERECTOMY      bilateral tubes removed    LAPAROTOMY OOPHERECTOMY Bilateral     MANDIBLE SURGERY      TMJ implant     Allergies   Allergen Reactions    Phenylephrine Shortness of breath     Other reaction(s): Difficulty Breathing    Pseudoephedrine Hcl Shortness of breath and Unknown    Chicken Derived Swelling     KFC    Loratadine Unknown    Morphine Nausea/vomiting and Unknown     Other reaction(s): Vomiting       Review of Systems  /61 (BP Location: Right arm, Patient Position: Sitting, BP Cuff Size: Adult)   Pulse 72   Temp 36.4 °C (97.5 °F) (Temporal)   Resp 17    "Ht 1.676 m (5' 6\")   Wt 83 kg (183 lb)   SpO2 98%   BMI 29.54 kg/m²    Objective   Physical Exam  Left breast by lumpectomy site healed nicely.  Nipple drawn out slightly laterally but otherwise good contour.  Left axillary incision healed nicely.  No left upper extremity lymphedema  Assessment/Plan   Problem List Items Addressed This Visit           ICD-10-CM    Infiltrating ductal carcinoma of left breast - Primary C50.912    Patient is status post lumpectomy and sentinel node biopsy for T2 N0 MX grade 3 out of 3 ER/[place] positive HER2/elizabeth negative tumor.  This prognostic stage Ib.  Recommend follow-up with oncology and radiation oncology for discussion of adjuvant therapy.  Recommend she follow-up with me in 6 months for repeat exam.  I will be ordering her imaging in the future.         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  Recommend when she follow-up with oncology she discussed WHEN future surveillance imaging may be needed             Breast History:     Patient is new to clinic and presents for eft US breast biopsy consult Bx 1/30 .  Patient is referred by Celina OKEEFE.  Patient seen Celina OKEEFE in office on 11/20/2024 for annual exam. Order was for annual BI mammogram screening.  Patient had BI mammogram Bilateral screening tomosynthesis completed on 11/22/2024. Impression was Left breast mass with calcification. Additional mammographic and sonographic imaging recommended.No evidence of malignancy right breast. Due to results additional orders were placed to be done.  Patient had BI mammogram left diagnostic tomosynthesis completed  on 12/20/2024. Impression was Highly suspicious mammographic mass for which ultrasound evaluation is still recommended.. The patient reported to the technologist she would return for the sonographic evaluation. Due to result additional testing ordered.  Patient had BI US breast limited left completed " on 1/13/2025. Impression was At the 12 o'clock position left breast with a distance from nipple  of 5 cm is a spiculated mass demonstrating marked acoustic shadowing  and increased stiffness on elastography, highly suspicious for malignancy. Biopsy is recommended. Need for biopsy was discussed with the patient at the time of the exam. The 2 o'clock position there is a focal area of oval  fibroglandular appearing tissue likely representing asymmetric breast  tissue based on this finding and previous mammographic findings.  With a 1.3 cm mass left breast 12:00 approximately 3 cm D FN suspicious for adenocarcinoma.  Recommend ultrasound-guided core biopsy. Patient to be scheduled for ultrasound guided  biopsy of the breast in the radiology department. Risk, benefits and alternatives to this plan were thoroughly discussed with the patient who agrees to proceed.  The procedure was also thoroughly discussed as well as her follow-up. She is to see me in the office in one week but I also will call as soon as I see the pathology which is usually 4 working days from procedure.   Patient with a 2.1 cm asymmetry left breast 2 o'clock position 5 cm D FN that appears to be unchanged from previous imaging. If the 12:00 lesion turns out to be a cancer patient is a good candidate for preoperative MRI   Patient returns to clinic and presents for discussion of breast biopsy results.  Patient was last seen in clinic on 1/27/2025 for left US biopsy consult , bx on 1/30.  Surgical pathology was collected and finalized.   patient with approximately 1.3 cm invasive ductal carcinoma grade 2-3 out of 3 ER/OK positive HER2/elizabeth negative.  Pathology was thoroughly reviewed with the patient.  Patient had an MRI bilateral breast and now has issues with lesion seen in the right breast.  We will coordinate for biopsy of those areas in the right breast and she will have 1 more follow-up visit before her surgery.  At this time we reviewed her  surgical plan which would include Magseed localization lumpectomy left breast with sentinel node biopsy.  Risk-benefit alternative doing the surgery were thoroughly discussed with the patient agrees to proceed.  We did discuss the option of mastectomy but this was not recommended considering her tumor characteristics.   patient is unknown family history with her mother and that side of the family as a result recommending genetic testing.  Patient with 2 new masses seen in the right breast 1 in the upper outer quadrant 1 in the lower medial quadrant. These require biopsy prior to proceeding with her left breast cancer surgery. Risk of its alternatives of doing the biopsies were thoroughly discussed with the patient who agrees to proceed. We will bring her back for 1 last visit prior to surgery after the biopsies are complete   Patient returns to clinic for follow up to discuss right stereotatic breast biopsy results, bx on 3/17.   Patient last seen in the office on 2/27/2025 for discussion of breast biopsy results.   Surgical pathology was collected and finalized.  Patient was seen by her cardiologist who also wants to do an intervention.  We will look into his note to ensure we are okay to proceed with surgery.  Patient seen today with her daughter on phone  Patient with 1.3 cm lesion, IDC, grade 2 out of 3.  ER/PA positive HER2/elizabeth negative tumor.  Left breast 12 o'clock position approximately 5 cm D FN.  Long discussion was held with the patient and her daughter concerning how her surgical day will proceed.  Patient is undergoing a Magseed localization followed by lumpectomy and sentinel node biopsy.  Risk-benefit alternatives of doing the surgery were thoroughly discussed with the patient and her daughter who agree to proceed.  Patient also has concerns about potential nasal fracture.  The trauma was over 5 years ago.  Recently however the effects were found on imaging.  I informed her we do not do anything  intranasal for the surgery.  We will alert anesthesia to her issue.  Patient status post ultrasound-guided biopsy as well as a stereotactic biopsy of the right breast of 2 abnormalities that were benign. There is concordanc    Previous Biopsy: No Menarche Age: 12 Age of first delivery: 16 Breastfeed: Tried to Menopause age: 42 HRT: No Family history of breast cancer: no Family history of ovarian cancer: no Family history of pancreatic cancer: no G 4 P 3     Bonnie Martino MD 05/23/25 10:39 AM

## 2025-05-23 ENCOUNTER — OFFICE VISIT (OUTPATIENT)
Dept: SURGERY | Facility: CLINIC | Age: 70
End: 2025-05-23
Payer: MEDICARE

## 2025-05-23 VITALS
RESPIRATION RATE: 17 BRPM | HEART RATE: 72 BPM | WEIGHT: 183 LBS | DIASTOLIC BLOOD PRESSURE: 61 MMHG | TEMPERATURE: 97.5 F | BODY MASS INDEX: 29.41 KG/M2 | SYSTOLIC BLOOD PRESSURE: 111 MMHG | HEIGHT: 66 IN | OXYGEN SATURATION: 98 %

## 2025-05-23 DIAGNOSIS — D38.1 NEOPLASM OF UNCERTAIN BEHAVIOR OF LUNG: ICD-10-CM

## 2025-05-23 DIAGNOSIS — C50.912 INFILTRATING DUCTAL CARCINOMA OF LEFT BREAST: Primary | ICD-10-CM

## 2025-05-23 PROCEDURE — 1036F TOBACCO NON-USER: CPT | Performed by: SURGERY

## 2025-05-23 PROCEDURE — 3008F BODY MASS INDEX DOCD: CPT | Performed by: SURGERY

## 2025-05-23 PROCEDURE — 1157F ADVNC CARE PLAN IN RCRD: CPT | Performed by: SURGERY

## 2025-05-23 PROCEDURE — 1159F MED LIST DOCD IN RCRD: CPT | Performed by: SURGERY

## 2025-05-23 PROCEDURE — 1125F AMNT PAIN NOTED PAIN PRSNT: CPT | Performed by: SURGERY

## 2025-05-23 PROCEDURE — 99211 OFF/OP EST MAY X REQ PHY/QHP: CPT | Performed by: SURGERY

## 2025-05-23 ASSESSMENT — PATIENT HEALTH QUESTIONNAIRE - PHQ9
2. FEELING DOWN, DEPRESSED OR HOPELESS: NOT AT ALL
SUM OF ALL RESPONSES TO PHQ9 QUESTIONS 1 & 2: 0
1. LITTLE INTEREST OR PLEASURE IN DOING THINGS: NOT AT ALL

## 2025-05-23 ASSESSMENT — LIFESTYLE VARIABLES
HOW MANY STANDARD DRINKS CONTAINING ALCOHOL DO YOU HAVE ON A TYPICAL DAY: 1 OR 2
HOW OFTEN DO YOU HAVE A DRINK CONTAINING ALCOHOL: MONTHLY OR LESS
AUDIT-C TOTAL SCORE: 1
HOW OFTEN DO YOU HAVE SIX OR MORE DRINKS ON ONE OCCASION: NEVER
SKIP TO QUESTIONS 9-10: 1

## 2025-05-23 ASSESSMENT — PAIN SCALES - GENERAL: PAINLEVEL_OUTOF10: 2

## 2025-05-23 ASSESSMENT — ENCOUNTER SYMPTOMS
DEPRESSION: 0
LOSS OF SENSATION IN FEET: 0
OCCASIONAL FEELINGS OF UNSTEADINESS: 0

## 2025-05-23 ASSESSMENT — COLUMBIA-SUICIDE SEVERITY RATING SCALE - C-SSRS
2. HAVE YOU ACTUALLY HAD ANY THOUGHTS OF KILLING YOURSELF?: NO
6. HAVE YOU EVER DONE ANYTHING, STARTED TO DO ANYTHING, OR PREPARED TO DO ANYTHING TO END YOUR LIFE?: NO
1. IN THE PAST MONTH, HAVE YOU WISHED YOU WERE DEAD OR WISHED YOU COULD GO TO SLEEP AND NOT WAKE UP?: NO

## 2025-05-23 NOTE — ASSESSMENT & PLAN NOTE
Patient is status post lumpectomy and sentinel node biopsy for T2 N0 MX grade 3 out of 3 ER/[place] positive HER2/elizabeth negative tumor.  This prognostic stage Ib.  Recommend follow-up with oncology and radiation oncology for discussion of adjuvant therapy.  Recommend she follow-up with me in 6 months for repeat exam.  I will be ordering her imaging in the future.

## 2025-05-23 NOTE — ASSESSMENT & PLAN NOTE
Patient with very small pulmonary nodules.  Recommend when she follow-up with oncology she discussed WHEN future surveillance imaging may be needed

## 2025-05-27 ENCOUNTER — TELEPHONE (OUTPATIENT)
Dept: HEMATOLOGY/ONCOLOGY | Facility: CLINIC | Age: 70
End: 2025-05-27
Payer: MEDICARE

## 2025-05-27 DIAGNOSIS — C50.912 INFILTRATING DUCTAL CARCINOMA OF LEFT BREAST: ICD-10-CM

## 2025-05-27 DIAGNOSIS — N63.21 MASS OF UPPER OUTER QUADRANT OF LEFT BREAST: ICD-10-CM

## 2025-05-27 LAB — TEST COMMENT - SURGICAL SENDOUT REQUEST: NORMAL

## 2025-05-27 NOTE — TELEPHONE ENCOUNTER
Called and LM for patient that her New Patient Consult with Dr. Keagan Fong 06/05/2025 was cancelled and rescheduled to 06/24/2025 @ 3:15p.m. due to a Genetic Test that Dr. Boogie had ordered and takes about 4 weeks to process. I asked for patient to please call me back so I can confirm.

## 2025-06-05 ENCOUNTER — APPOINTMENT (OUTPATIENT)
Dept: HEMATOLOGY/ONCOLOGY | Facility: CLINIC | Age: 70
End: 2025-06-05
Payer: MEDICARE

## 2025-06-06 LAB — SCAN RESULT: NORMAL

## 2025-06-08 NOTE — PROGRESS NOTES
Radiation Oncology Outpatient Consult    Patient Name:  Patsy Menendez  MRN:  [id number]  :  1955    Referring Provider: Bonnie Martino MD  Primary Care Provider: MALDONADO Ernandez  Care Team: Patient Care Team:  MALDONADO Ernandez as PCP - General (Family Medicine)    Date of Service: 2025     Diagnosis: Infiltrating ductal carcinoma of overlapping sites of left breast in female, Pathologic: Stage IA (pT2, pN0(sn), cM0, G2, ER+, WY+, HER2-)  Infiltrating ductal carcinoma of overlapping sites of left breast in female, Pathologic: Stage IB (pT2, pN0(sn), cM0, G3, ER+, WY+, HER2-)    Previous Treatments:  2025: left breast lumpectomy and sentinel node biopsy    History of Present Illness:  Ms. Menendez is a 70 y.o. woman who had a routine screening mammogram on 2024 that showed a spiculated mass with calcifications in the central superior aspect of the left breast. Diagnostic mammogram on 2024 showed a highly suspicious mass at the 12 o'clock position of the left breast. Targeted ultrasound on 2025 showed a 1.3 x 1.3 x 1.1 cm mass in the left breast at the 12 o'clock position, 3 cm from the nipple. No abnormal-appearing lymph nodes were seen. Ultrasound-guided biopsy of the left breast mass was performed on 2025, with pathology positive for invasive ductal carcinoma, grade 2-3, ER/WY positive, HER2/elizabeth negative. DCIS was seen as well.  Breast MRI was performed on 2025, and showed the previously biopsied left breast mass, which measured 1.3 x 1.2 x 1.4 cm, in the superior central aspect, middle depth. There was no abnormal lymphadenopathy. In the right breast, there was an irregular enhancing mass in the superolateral aspect, anterior to middle depth, which measured 0.9 x 0.9 x 1.1 cm. Additionally seen was an irregular enhancing mass in the inferior, medial aspect of the right breast, middle depth, 0.6 x 0.6 x 0.6 cm in size. Ultrasound-guided biopsy was  performed on 3/17/2025, which redemonstrated the mass seen in the superolateral aspect of the right breast on MRI. The inferior/medial right breast did not show any abnormalities to target for biopsy. Pathology from the right breast mass located at 9:00, 3 cm from the nipple showed breast tissue with dense fibrotic stroma and usual ductal hyperplasia, and microcalcifications.    She then underwent a left breast lumpectomy and sentinel node biopsy on 5/9/2025, which showed a 2.2 cm invasive ductal carcinoma, grade 3, with negative margins greater than 2 mm. There was indeterminate LVI. There was 1 sentinel node removed, which was negative for metastatic carcinoma. DCIS component was removed as well, grade 2, with negative margins (closest was less than 1 mm, medial, and anterior/superior). Her Oncotype score was 12.    Currently, she overall feels well. She denies any breast pain or discomfort. She denies any breast masses or lesions. She denies any bleeding or wound difficulties.      The patient was seen for an opinion regarding radiation options for the diagnosis above. I personally reviewed the available outside records and imaging studies as detailed in the HPI. The allergies, medications, past medical history, surgical history, social history, family history, and review of systems were reviewed.     Prior Radiotherapy:  No radiation treatments to show. (Treatments may have been administered in another system.)       Past Medical History:  Medical History[1]     Past Surgical History:  Surgical History[2]     Family History:  Cancer-related family history includes Brain cancer in her mother's sister; Cancer in her brother.    Social History:  Social History[3]    Allergies:  Allergies[4]     Medications:  Current Medications[5]      Review of Systems:  Review of Systems - Oncology    Performance Status:  The Karnofsky performance scale today is 90, Able to carry on normal activity; minor signs or symptoms of  disease (ECOG equivalent 0).        OBJECTIVE  /74   Pulse 72   Temp 36.6 °C (97.9 °F) (Temporal)   Resp 18   Wt 84.7 kg (186 lb 11.7 oz)   SpO2 98%   BMI 30.14 kg/m²    Physical Exam  Vitals reviewed.   Constitutional:       General: She is not in acute distress.     Appearance: Normal appearance. She is not ill-appearing.   HENT:      Head: Normocephalic and atraumatic.      Mouth/Throat:      Mouth: Mucous membranes are moist.   Eyes:      Conjunctiva/sclera: Conjunctivae normal.   Cardiovascular:      Rate and Rhythm: Normal rate.   Pulmonary:      Effort: Pulmonary effort is normal.   Chest:      Comments: Breast exam declined  Abdominal:      General: There is no distension.   Musculoskeletal:         General: Normal range of motion.   Skin:     General: Skin is warm and dry.   Neurological:      Mental Status: She is alert and oriented to person, place, and time.   Psychiatric:         Mood and Affect: Mood normal.         Behavior: Behavior normal.          Laboratory Review:  There are no laboratory contraindications to radiation therapy.    The pertinent lab results were reviewed and discussed with the patient.  Notably, per HPI      Pathology Review:  The pertinent pathology results were reviewed and discussed with the patient.  Notably, per HPI     Imaging:  The pertinent imaging results were reviewed and discussed with the patient.  Notably, per HPI       ASSESSMENT:   Ms. Menendez is a 70 y.o. woman with a diagnosis of Infiltrating ductal carcinoma of overlapping sites of left breast in female, Pathologic: Stage IA (pT2, pN0(sn), cM0, G2, ER+, [place]+, HER2-)  Infiltrating ductal carcinoma of overlapping sites of left breast in female, Pathologic: Stage IB (pT2, pN0(sn), cM0, G3, ER+, CO+, HER2-), status post lumpectomy and sentinel node biopsy, here for a discussion of adjuvant radiation treatment.      PLAN:   We had an extensive discussion regarding role of radiation in this setting.     We  reviewed the standard of care for post-lumpectomy treatment for early stage breast cancer, which would include adjuvant radiotherapy per national guidelines. This would consist of hypofractionated radiotherapy, to a dose of 42.56 Gy in 16 fractions. We also discussed the growing literature to support ultra hypofractionated whole breast radiation, to a dose of 26 Gy in 5 fractions, which would be reasonable to consider in this setting as well. After discussion, she stated she would like some additional time to consider whether she would pursue treatment, and would like to meet with medical oncology prior to making a final decision. If undergoing radiation, she would like to proceed with ultra-hypofractionated whole breast radiation.    Given her grade 3 disease and close DCIS margins, we would also consider a boost to the tumor bed of 10 Gy in 5 fractions, for a total dose of 36 Gy in 10 fractions, depending on our ability to visualize this location and meet treatment planning parameters. This would further decrease her risk of in-breast recurrence. Although this effect is somewhat diminished in patients >age 60, given her performance status, it could still be considered in this setting.    We discussed the acute side effects of therapies and potential late toxicities. During the course of radiation, acute side effects could include, but are not limited to fatigue, dermatitis, or chest wall discomfort. The typical timeline for the resolution of these effects as well as available supportive therapies were reviewed. Potential long term side effects can include, but are not limited to,  fibrosis, increased heart disease risk, rib fractures, radiation pneumonitis, and a small risk of second malignancies. Given her left-sided disease, we will plan on treatment in the prone position, to help reduce incidental dose to the heart.    After the discussion, the patient was given the opportunity to ask questions which were  subsequently answered, and our contact information was given.    Please contact our department with any further questions regarding the plan of management.    The length of the visit was 60 minutes. We spent more than 50% of this time discussing treatment options with the patient.    Recommendations:  - Ultra-hypofractionated whole breast radiation to a dose of 26 Gy in 5 fractions, followed by a 10 Gy in 5 fractions lumpectomy bed boost  - CT simulation in the coming weeks, pending visit with medical oncology and treatment decision; will call our office if interested in scheduling.  [facility] Guidelines were applicable to guide this patients treatment plan.   Sumaya Mauro DO            [1]   Past Medical History:  Diagnosis Date    Adverse effect of anesthesia     CONCERNED ABOUT PRESSURE ON FACE WHILE UNDER. HS NASAL FRACTURE 5 YEARS AGO.    Breast cancer     Carpal tunnel syndrome    [2]   Past Surgical History:  Procedure Laterality Date    APPENDECTOMY      BREAST BIOPSY Left 2025    BREAST LUMPECTOMY W/ NEEDLE LOCALIZATION Left 2025    & SN[facility]    BREAST SURGERY Bilateral     bilateral breast cyst surgery for fibroids    CARDIAC CATHETERIZATION N/A 2025    Procedure: C, With LV;  Surgeon: Chuy Rod MD;  Location: [facility] Cardiac Cath Lab;  Service: Cardiovascular;  Laterality: N/A;  25--730 am for [facility] [address] for abn stress test R93.1 and angina I20.9  Medicare and Med Mut; no PA required    CARPAL TUNNEL RELEASE Right      SECTION, LOW TRANSVERSE      x3 in the 70s    CHOLECYSTECTOMY      laparoscopic    HYSTERECTOMY      bilateral tubes removed    LAPAROTOMY OOPHERECTOMY Bilateral     MANDIBLE SURGERY      TMJ implant   [3]   Social History  Tobacco Use    Smoking status: Former     Current packs/day: 0.00     Average packs/day: 0.3 packs/day for 3.3 years (0.8 ttl pk-yrs)     Types: Cigarettes     Start date: 1969     Quit date: 1972     Years since  quittin.1     Passive exposure: Past    Smokeless tobacco: Never   Vaping Use    Vaping status: Never Used   Substance Use Topics    Alcohol use: Yes     Comment: OCCASIONAL    Drug use: Never   [4]   Allergies  Allergen Reactions    Phenylephrine Shortness of breath     Other reaction(s): Difficulty Breathing    Pseudoephedrine Hcl Shortness of breath and Unknown    Chicken Derived Swelling     [facility]    Loratadine Unknown    Morphine Nausea/vomiting and Unknown     Other reaction(s): Vomiting   [5]   Current Outpatient Medications:     albuterol (ProAir HFA) 90 mcg/actuation inhaler, Inhale 2 puffs every 4 hours if needed for wheezing or shortness of breath. (Patient not taking: Reported on 2025), Disp: 8.5 g, Rfl: 5    aspirin 81 mg EC tablet, Take 1 tablet (81 mg) by mouth once daily. LD 2025 (Patient not taking: Reported on 2025), Disp: , Rfl:     HYDROcodone-acetaminophen (Norco) 5-325 mg tablet, Take 1 tablet by mouth every 6 hours if needed for moderate pain (4 - 6). (Patient not taking: Reported on 2025), Disp: 20 tablet, Rfl: 0    ondansetron ODT (Zofran-ODT) 4 mg disintegrating tablet, Dissolve 1 tablet (4 mg) in the mouth every 8 hours if needed for nausea or vomiting. (Patient not taking: Reported on 2025), Disp: 20 tablet, Rfl: 0    ranolazine (Ranexa) 500 mg 12 hr tablet, Take 1 tablet (500 mg) by mouth 2 times a day. Do not crush, chew, or split. (Patient not taking: Reported on 2025), Disp: 60 tablet, Rfl: 0

## 2025-06-09 ENCOUNTER — HOSPITAL ENCOUNTER (OUTPATIENT)
Dept: RADIATION ONCOLOGY | Facility: CLINIC | Age: 70
Setting detail: RADIATION/ONCOLOGY SERIES
Discharge: HOME | End: 2025-06-09
Payer: MEDICARE

## 2025-06-09 VITALS
TEMPERATURE: 97.9 F | OXYGEN SATURATION: 98 % | WEIGHT: 186.73 LBS | RESPIRATION RATE: 18 BRPM | BODY MASS INDEX: 30.14 KG/M2 | SYSTOLIC BLOOD PRESSURE: 137 MMHG | DIASTOLIC BLOOD PRESSURE: 74 MMHG | HEART RATE: 72 BPM

## 2025-06-09 DIAGNOSIS — C50.812 INFILTRATING DUCTAL CARCINOMA OF OVERLAPPING SITES OF LEFT BREAST IN FEMALE: Primary | ICD-10-CM

## 2025-06-09 DIAGNOSIS — C50.912 INFILTRATING DUCTAL CARCINOMA OF LEFT BREAST: ICD-10-CM

## 2025-06-09 PROCEDURE — 99205 OFFICE O/P NEW HI 60 MIN: CPT | Performed by: STUDENT IN AN ORGANIZED HEALTH CARE EDUCATION/TRAINING PROGRAM

## 2025-06-09 PROCEDURE — G2211 COMPLEX E/M VISIT ADD ON: HCPCS | Performed by: STUDENT IN AN ORGANIZED HEALTH CARE EDUCATION/TRAINING PROGRAM

## 2025-06-09 PROCEDURE — 99215 OFFICE O/P EST HI 40 MIN: CPT | Performed by: STUDENT IN AN ORGANIZED HEALTH CARE EDUCATION/TRAINING PROGRAM

## 2025-06-09 ASSESSMENT — ENCOUNTER SYMPTOMS
ABDOMINAL PAIN: 1
COUGH: 1
LOSS OF SENSATION IN FEET: 0
OCCASIONAL FEELINGS OF UNSTEADINESS: 0
APPETITE CHANGE: 1
DEPRESSION: 0

## 2025-06-09 ASSESSMENT — PATIENT HEALTH QUESTIONNAIRE - PHQ9
2. FEELING DOWN, DEPRESSED OR HOPELESS: NOT AT ALL
1. LITTLE INTEREST OR PLEASURE IN DOING THINGS: NOT AT ALL
SUM OF ALL RESPONSES TO PHQ9 QUESTIONS 1 AND 2: 0

## 2025-06-09 ASSESSMENT — PAIN SCALES - GENERAL: PAINLEVEL_OUTOF10: 0-NO PAIN

## 2025-06-09 NOTE — PROGRESS NOTES
Radiation Oncology Nursing Note    Prior Radiotherapy:  No  Radiation Treatments       No radiation treatments to show. (Treatments may have been administered in another system.)          Current Systemic Treatment:  No     Presence of Pacemaker or ICD:  No    History of Autoimmune or Connective Tissue Disorders:  No    Pain: The patient's current pain level was assessed.  They report currently having a pain of 0 out of 10.  They feel their pain is under control without the use of pain medications.    Review of Systems:  Review of Systems   Constitutional:  Positive for appetite change.   HENT:   Positive for hearing loss and tinnitus.    Respiratory:  Positive for cough.    Gastrointestinal:  Positive for abdominal pain.   All other systems reviewed and are negative.    Education Documentation  Radiation Therapy (External Beam) : What Is Radiation Therapy, taught by Robin Felipe RN at 6/9/2025  3:22 PM.  Learner: Family, Patient  Readiness: Acceptance  Method: Explanation  Response: Verbalizes Understanding    Radiation Therapy (External Beam) : Side Effects, taught by Robin Felipe RN at 6/9/2025  3:22 PM.  Learner: Family, Patient  Readiness: Acceptance  Method: Explanation  Response: Verbalizes Understanding    Radiation Therapy (External Beam) : Review, taught by Robin Felipe RN at 6/9/2025  3:22 PM.  Learner: Family, Patient  Readiness: Acceptance  Method: Explanation  Response: Verbalizes Understanding    Radiation Therapy (External Beam) : Life During Treatment, taught by Robin Felipe RN at 6/9/2025  3:22 PM.  Learner: Family, Patient  Readiness: Acceptance  Method: Explanation  Response: Verbalizes Understanding    Radiation Therapy (External Beam) : Getting Radiation, taught by Robin Felipe RN at 6/9/2025  3:22 PM.  Learner: Family, Patient  Readiness: Acceptance  Method: Explanation  Response: Verbalizes Understanding    Treatment Plan and Schedule, taught by Robin RUSSELL  Destinee RN at 6/9/2025  3:22 PM.  Learner: Family, Patient  Readiness: Acceptance  Method: Explanation  Response: Verbalizes Understanding    Education Comments  No comments found.      Patient will see medical oncologist and call this office with treatment decision. Education and handouts about radiation therapy and possible side effects given.

## 2025-06-23 ENCOUNTER — PATIENT OUTREACH (OUTPATIENT)
Dept: HEMATOLOGY/ONCOLOGY | Facility: CLINIC | Age: 70
End: 2025-06-23
Payer: MEDICARE

## 2025-06-23 SDOH — ECONOMIC STABILITY: FOOD INSECURITY: WITHIN THE PAST 12 MONTHS, YOU WORRIED THAT YOUR FOOD WOULD RUN OUT BEFORE YOU GOT MONEY TO BUY MORE.: NEVER TRUE

## 2025-06-23 SDOH — HEALTH STABILITY: PHYSICAL HEALTH: ON AVERAGE, HOW MANY MINUTES DO YOU ENGAGE IN EXERCISE AT THIS LEVEL?: 0 MIN

## 2025-06-23 SDOH — ECONOMIC STABILITY: FOOD INSECURITY: WITHIN THE PAST 12 MONTHS, THE FOOD YOU BOUGHT JUST DIDN'T LAST AND YOU DIDN'T HAVE MONEY TO GET MORE.: NEVER TRUE

## 2025-06-23 SDOH — ECONOMIC STABILITY: GENERAL
WHICH OF THE FOLLOWING WOULD YOU LIKE TO GET CONNECTED TO IN ORDER TO RECEIVE A DISCOUNT OR FOR FREE? (CHOOSE ALL THAT APPLY): NO ASSISTANCE NEEDED

## 2025-06-23 SDOH — ECONOMIC STABILITY: INCOME INSECURITY: IN THE LAST 12 MONTHS, WAS THERE A TIME WHEN YOU WERE NOT ABLE TO PAY THE MORTGAGE OR RENT ON TIME?: YES

## 2025-06-23 SDOH — ECONOMIC STABILITY: GENERAL
WHICH OF THE FOLLOWING DO YOU KNOW HOW TO USE AND HAVE ACCESS TO EVERY DAY? (CHOOSE ALL THAT APPLY): DESKTOP COMPUTER, LAPTOP COMPUTER, OR TABLET WITH BROADBAND INTERNET CONNECTION;SMARTPHONE WITH CELLULAR DATA PLAN

## 2025-06-23 SDOH — HEALTH STABILITY: PHYSICAL HEALTH: ON AVERAGE, HOW MANY DAYS PER WEEK DO YOU ENGAGE IN MODERATE TO STRENUOUS EXERCISE (LIKE A BRISK WALK)?: 0 DAYS

## 2025-06-23 SDOH — ECONOMIC STABILITY: TRANSPORTATION INSECURITY
IN THE PAST 12 MONTHS, HAS THE LACK OF TRANSPORTATION KEPT YOU FROM MEDICAL APPOINTMENTS OR FROM GETTING MEDICATIONS?: NO

## 2025-06-23 SDOH — ECONOMIC STABILITY: TRANSPORTATION INSECURITY
IN THE PAST 12 MONTHS, HAS LACK OF TRANSPORTATION KEPT YOU FROM MEETINGS, WORK, OR FROM GETTING THINGS NEEDED FOR DAILY LIVING?: NO

## 2025-06-23 ASSESSMENT — SOCIAL DETERMINANTS OF HEALTH (SDOH)
WITHIN THE LAST YEAR, HAVE TO BEEN RAPED OR FORCED TO HAVE ANY KIND OF SEXUAL ACTIVITY BY YOUR PARTNER OR EX-PARTNER?: NO
IN A TYPICAL WEEK, HOW MANY TIMES DO YOU TALK ON THE PHONE WITH FAMILY, FRIENDS, OR NEIGHBORS?: MORE THAN THREE TIMES A WEEK
HOW HARD IS IT FOR YOU TO PAY FOR THE VERY BASICS LIKE FOOD, HOUSING, MEDICAL CARE, AND HEATING?: NOT HARD AT ALL
WITHIN THE LAST YEAR, HAVE YOU BEEN KICKED, HIT, SLAPPED, OR OTHERWISE PHYSICALLY HURT BY YOUR PARTNER OR EX-PARTNER?: NO
HOW OFTEN DO YOU GET TOGETHER WITH FRIENDS OR RELATIVES?: MORE THAN THREE TIMES A WEEK
WITHIN THE LAST YEAR, HAVE YOU BEEN AFRAID OF YOUR PARTNER OR EX-PARTNER?: NO
WITHIN THE LAST YEAR, HAVE YOU BEEN HUMILIATED OR EMOTIONALLY ABUSED IN OTHER WAYS BY YOUR PARTNER OR EX-PARTNER?: NO
DO YOU BELONG TO ANY CLUBS OR ORGANIZATIONS SUCH AS CHURCH GROUPS UNIONS, FRATERNAL OR ATHLETIC GROUPS, OR SCHOOL GROUPS?: NO
IN THE PAST 12 MONTHS, HAS THE ELECTRIC, GAS, OIL, OR WATER COMPANY THREATENED TO SHUT OFF SERVICE IN YOUR HOME?: NO
HOW OFTEN DO YOU ATTEND CHURCH OR RELIGIOUS SERVICES?: NEVER
HOW OFTEN DO YOU ATTENT MEETINGS OF THE CLUB OR ORGANIZATION YOU BELONG TO?: NEVER

## 2025-06-23 ASSESSMENT — LIFESTYLE VARIABLES
HOW MANY STANDARD DRINKS CONTAINING ALCOHOL DO YOU HAVE ON A TYPICAL DAY: 1 OR 2
HOW OFTEN DO YOU HAVE SIX OR MORE DRINKS ON ONE OCCASION: NEVER
AUDIT-C TOTAL SCORE: 1
HOW OFTEN DO YOU HAVE A DRINK CONTAINING ALCOHOL: MONTHLY OR LESS
SKIP TO QUESTIONS 9-10: 1

## 2025-06-23 ASSESSMENT — PATIENT HEALTH QUESTIONNAIRE - PHQ9
1. LITTLE INTEREST OR PLEASURE IN DOING THINGS: NOT AT ALL
SUM OF ALL RESPONSES TO PHQ9 QUESTIONS 1 & 2: 0
2. FEELING DOWN, DEPRESSED OR HOPELESS: NOT AT ALL

## 2025-06-24 ENCOUNTER — TELEPHONE (OUTPATIENT)
Dept: HEMATOLOGY/ONCOLOGY | Facility: CLINIC | Age: 70
End: 2025-06-24
Payer: MEDICARE

## 2025-06-24 ENCOUNTER — OFFICE VISIT (OUTPATIENT)
Dept: HEMATOLOGY/ONCOLOGY | Facility: CLINIC | Age: 70
End: 2025-06-24
Payer: MEDICARE

## 2025-06-24 VITALS
TEMPERATURE: 97.5 F | RESPIRATION RATE: 18 BRPM | DIASTOLIC BLOOD PRESSURE: 63 MMHG | SYSTOLIC BLOOD PRESSURE: 129 MMHG | OXYGEN SATURATION: 94 % | BODY MASS INDEX: 30.05 KG/M2 | HEART RATE: 95 BPM | WEIGHT: 186.18 LBS

## 2025-06-24 DIAGNOSIS — C50.912 INFILTRATING DUCTAL CARCINOMA OF LEFT BREAST: ICD-10-CM

## 2025-06-24 PROCEDURE — 99215 OFFICE O/P EST HI 40 MIN: CPT | Performed by: INTERNAL MEDICINE

## 2025-06-24 PROCEDURE — 99205 OFFICE O/P NEW HI 60 MIN: CPT | Performed by: INTERNAL MEDICINE

## 2025-06-24 PROCEDURE — 1126F AMNT PAIN NOTED NONE PRSNT: CPT | Performed by: INTERNAL MEDICINE

## 2025-06-24 RX ORDER — LETROZOLE 2.5 MG/1
2.5 TABLET, FILM COATED ORAL DAILY
Qty: 60 TABLET | Refills: 5 | Status: SHIPPED | OUTPATIENT
Start: 2025-06-24 | End: 2026-06-24

## 2025-06-24 ASSESSMENT — PAIN SCALES - GENERAL: PAINLEVEL_OUTOF10: 0-NO PAIN

## 2025-06-24 NOTE — TELEPHONE ENCOUNTER
Called and LM for patient to see if she is able to come in [facility] for her appt with Dr. Keagan Fong @ 1:45p.m. I asked for patient to please call me back. I provided my name and contact information.

## 2025-06-24 NOTE — PROGRESS NOTES
Patient ID: Patsy Menendez is a 70 y.o. female.  Referring Physician: Bonnie Martino MD  [id number]  [facility] Physician Mario Alberto Bennett [address] [place], [place] [id number]  Primary Care Provider: MALDONADO Ernandez  Referral Reason: breast cancer    Subjective:  No complaints    Heme/Onc History:  Infiltrating ductal carcinoma of overlapping sites of left breast in female, Pathologic: Stage IA (pT2, pN0(sn), cM0, G2, ER+, CT+, HER2-)     Ms. Menendez is a 70 y.o. woman who had a routine screening mammogram on 11/22/2024 that showed a spiculated mass with calcifications in the central superior aspect of the left breast. Diagnostic mammogram on 12/20/2024 showed a highly suspicious mass at the 12 o'clock position of the left breast. Targeted ultrasound on 1/13/2025 showed a 1.3 x 1.3 x 1.1 cm mass in the left breast at the 12 o'clock position, 3 cm from the nipple. No abnormal-appearing lymph nodes were seen. Ultrasound-guided biopsy of the left breast mass was performed on 1/30/2025, with pathology positive for invasive ductal carcinoma, grade 2-3, ER/CT positive, HER2/elizabeth negative. DCIS was seen as well.  Breast MRI was performed on 2/21/2025, and showed the previously biopsied left breast mass, which measured 1.3 x 1.2 x 1.4 cm, in the superior central aspect, middle depth. There was no abnormal lymphadenopathy. In the right breast, there was an irregular enhancing mass in the superolateral aspect, anterior to middle depth, which measured 0.9 x 0.9 x 1.1 cm. Additionally seen was an irregular enhancing mass in the inferior, medial aspect of the right breast, middle depth, 0.6 x 0.6 x 0.6 cm in size. Ultrasound-guided biopsy was performed on 3/17/2025, which redemonstrated the mass seen in the superolateral aspect of the right breast on MRI. The inferior/medial right breast did not show any abnormalities to target for biopsy. Pathology from the right breast mass located at 9:00, 3 cm from the nipple showed breast  tissue with dense fibrotic stroma and usual ductal hyperplasia, and microcalcifications.     She then underwent a left breast lumpectomy and sentinel node biopsy on 2025, which showed a 2.2 cm invasive ductal carcinoma, grade 3, with negative margins greater than 2 mm. There was indeterminate LVI. There was 1 sentinel node removed, which was negative for metastatic carcinoma. DCIS component was removed as well, grade 2, with negative margins (closest was less than 1 mm, medial, and anterior/superior). Her Oncotype score was 12.     Currently, she overall feels well. She denies any breast pain or discomfort. She denies any breast masses or lesions. She denies any bleeding or wound difficulties.    Past Medical History: Medical History[1]  Social History:   Social History     Socioeconomic History    Marital status:      Spouse name: Not on file    Number of children: Not on file    Years of education: Not on file    Highest education level: Not on file   Occupational History    Not on file   Tobacco Use    Smoking status: Former     Current packs/day: 0.00     Average packs/day: 0.3 packs/day for 3.3 years (0.8 ttl pk-yrs)     Types: Cigarettes     Start date: 1969     Quit date: 1972     Years since quittin.2     Passive exposure: Past    Smokeless tobacco: Never   Vaping Use    Vaping status: Never Used   Substance and Sexual Activity    Alcohol use: Yes     Comment: OCCASIONAL    Drug use: Never    Sexual activity: Defer   Other Topics Concern    Not on file   Social History Narrative    Not on file     Social Drivers of Health     Financial Resource Strain: Low Risk  (2025)    Overall Financial Resource Strain (CARDIA)     Difficulty of Paying Living Expenses: Not hard at all   Food Insecurity: No Food Insecurity (2025)    Hunger Vital Sign     Worried About Running Out of Food in the Last Year: Never true     Ran Out of Food in the Last Year: Never true   Transportation Needs:  No Transportation Needs (6/23/2025)    PRAPARE - Transportation     Lack of Transportation (Medical): No     Lack of Transportation (Non-Medical): No   Physical Activity: Inactive (6/23/2025)    Exercise Vital Sign     Days of Exercise per Week: 0 days     Minutes of Exercise per Session: 0 min   Stress: No Stress Concern Present (6/23/2025)    [employer] [place] [employer] - Occupational Stress Questionnaire     Feeling of Stress : Only a little   Recent Concern: Stress - Stress Concern Present (4/29/2025)    [employer] [place] [employer] - Occupational Stress Questionnaire     Feeling of Stress : To some extent   Social Connections: Moderately Isolated (6/23/2025)    Social Connection and Isolation Panel [NHANES]     Frequency of Communication with Friends and Family: More than three times a week     Frequency of Social Gatherings with Friends and Family: More than three times a week     Attends Yarsani Services: Never     Active Member of Clubs or Organizations: No     Attends Club or Organization Meetings: Never     Marital Status:    Intimate Partner Violence: Not At Risk (6/23/2025)    Humiliation, Afraid, Rape, and Kick questionnaire     Fear of Current or Ex-Partner: No     Emotionally Abused: No     Physically Abused: No     Sexually Abused: No   Housing Stability: High Risk (6/23/2025)    Housing Stability Vital Sign     Unable to Pay for Housing in the Last Year: Yes     Number of Times Moved in the Last Year: 0     Homeless in the Last Year: No     Surgical History: Surgical History[2]  Family History: Family History[3]   reports that she quit smoking about 53 years ago. Her smoking use included cigarettes. She started smoking about 56 years ago. She has a 0.8 pack-year smoking history. She has been exposed to tobacco smoke. She has never used smokeless tobacco.  Oncology Family history: Cancer-related family history includes Brain cancer in her mother's sister; Cancer in  her brother.    Review Of Systems:  As stated per in HPI; otherwise all other 12 point ROS are negative    Physical Exam:  /63 (BP Location: Right arm, Patient Position: Sitting, BP Cuff Size: Adult long)   Pulse 95   Temp 36.4 °C (97.5 °F) (Temporal)   Resp 18   Wt 84.5 kg (186 lb 2.9 oz)   SpO2 94%   BMI 30.05 kg/m²   BSA: 1.98 meters squared  General: awake/alert/oriented x3, no distress, alert and cooperative  Head: Short hair fully covering scalp. Symmetric facial expressions  Eyes: PERRL, EOMI, clear sclera, eyebrows present.  Ears/Nose/Mouth/Throat:  Oral mucous membranes moist. No oral ulcers. No palpable pre/post-auricular lymph nodes  Neck: No palpable cervical chain lymph nodes  Respiratory: unlabored breathing on room air, good chest expansion, thorax symmetric  Cardio: Regular rate and rhythm, normal S1 and S2, radial pulses symmetric  GI: Nondistended, soft, non-tender abdomen  Musculoskeletal: Normal muscle bulk and tone, ROM intact, no joint swelling.  Rises from chair and walks unassisted.  Extremities: No ankle swelling, no arm or leg wounds  Neuro: Alert, cognition intact, speech normal. Facial expressions symmetric.  No motor deficits noted. Sensation intact to touch and hot/cold.   Able to stand from seated position unassisted and walks around the room unassisted.  Psychological: Appropriate mood and behavior.  Skin: Warm and dry, no lesions, no rashes    Results:  Diagnostic Results   Lab Results   Component Value Date    WBC 7.3 04/29/2025    HGB 14.3 04/29/2025    HCT 42.3 04/29/2025    MCV 82 04/29/2025     04/29/2025     Lab Results   Component Value Date    CALCIUM 8.7 04/29/2025     04/29/2025    K 3.8 04/29/2025    CO2 23 04/29/2025     (H) 04/29/2025    BUN 24 (H) 04/29/2025    CREATININE 0.67 04/29/2025    ALT 10 04/29/2025    AST 10 04/29/2025     Current Medications[4]     Assessment/Plan:  ? Breast Cancer:    Ms. Menendez is a 70 y.o. woman with a  diagnosis of Infiltrating ductal carcinoma of overlapping sites of left breast in female, Pathologic: Stage IA (pT2, pN0(sn), cM0, G2, ER+, TN+, HER2-)  Infiltrating ductal carcinoma of overlapping sites of left breast in female, Pathologic: Stage IB (pT2, pN0(sn), cM0, G3, ER+, TN+, HER2-), status post lumpectomy and sentinel node biopsy on 5/9/2025, which showed a 2.2 cm invasive ductal carcinoma, grade 3, with negative margins greater than 2 mm. There was indeterminate LVI. There was 1 sentinel node removed, which was negative for metastatic carcinoma. DCIS component was removed as well, grade 2, with negative margins (closest was less than 1 mm, medial, and anterior/superior). Her Oncotype score was 12.     Adjuvant AI for 5 years is recommended. She will do more research and think about it.    Baseline DEXA scan 11/24: normal. Calicum and vit D is recommended    She will get Ultra-hypofractionated whole breast radiation to a dose of 26 Gy in 5 fractions, followed by a 10 Gy in 5 fractions lumpectomy bed boost under direction of Dr. Mauro    [facility] in 6 months for toxicity check.    Diagnoses and all orders for this visit:  Infiltrating ductal carcinoma of left breast  -     Referral To Hematology and Oncology       Performance Status: Asymptomatic    I spent more than 60 minutes for the patient today, including face-to-face conversation, pre-visit preparation, post-visit orders, and others.   Keagan Fong MD                                [1]   Past Medical History:  Diagnosis Date    Adverse effect of anesthesia     CONCERNED ABOUT PRESSURE ON FACE WHILE UNDER. HS NASAL FRACTURE 5 YEARS AGO.    Breast cancer     Carpal tunnel syndrome    [2]   Past Surgical History:  Procedure Laterality Date    APPENDECTOMY      BREAST BIOPSY Left 01/30/2025    BREAST LUMPECTOMY W/ NEEDLE LOCALIZATION Left 05/09/2025    & SN[facility]    BREAST SURGERY Bilateral     bilateral breast cyst surgery for fibroids    CARDIAC  CATHETERIZATION N/A 2025    Procedure: [facility], With LV;  Surgeon: Chuy Rod MD;  Location: [facility] Cardiac Cath Lab;  Service: Cardiovascular;  Laterality: N/A;  25--730 am for [facility] [id number] for abn stress test R93.1 and angina I20.9  Medicare and Med Mut; no PA required    CARPAL TUNNEL RELEASE Right      SECTION, LOW TRANSVERSE      x3 in the 70s    CHOLECYSTECTOMY      laparoscopic    HYSTERECTOMY      bilateral tubes removed    LAPAROTOMY OOPHERECTOMY Bilateral     MANDIBLE SURGERY      TMJ implant   [3]   Family History  Problem Relation Name Age of Onset    Cancer Brother      Brain cancer Mother's Sister      Stroke Maternal Grandmother     [4]   Current Outpatient Medications:     albuterol (ProAir HFA) 90 mcg/actuation inhaler, Inhale 2 puffs every 4 hours if needed for wheezing or shortness of breath. (Patient not taking: Reported on 2025), Disp: 8.5 g, Rfl: 5    aspirin 81 mg EC tablet, Take 1 tablet (81 mg) by mouth once daily. LD 2025 (Patient not taking: Reported on 2025), Disp: , Rfl:     HYDROcodone-acetaminophen (Norco) 5-325 mg tablet, Take 1 tablet by mouth every 6 hours if needed for moderate pain (4 - 6). (Patient not taking: Reported on 2025), Disp: 20 tablet, Rfl: 0    ondansetron ODT (Zofran-ODT) 4 mg disintegrating tablet, Dissolve 1 tablet (4 mg) in the mouth every 8 hours if needed for nausea or vomiting. (Patient not taking: Reported on 2025), Disp: 20 tablet, Rfl: 0    ranolazine (Ranexa) 500 mg 12 hr tablet, Take 1 tablet (500 mg) by mouth 2 times a day. Do not crush, chew, or split. (Patient not taking: Reported on 2025), Disp: 60 tablet, Rfl: 0

## 2025-06-24 NOTE — PROGRESS NOTES
Pt seen in office today for a new patient  visit with Dr. Keagan Fong for management of her breast cancer. She has been referred to our office by Dr. Bonnie Martino. She is  without complaints today and denies pain.     Medications, pharmacy preference and allergies were reviewed with patient and updated in the medical record by MD.     Per orders,an Oncotype was completed prior to her visit.She is to start on Letrozole and is going to think about whether or not she wants to start this medication. She is to let our office know if she decides to start or not.PI sheets on the medication [facility].She will RTC in 6 months for a FUV with NITESH Rudolph.    Our contact information was given to patient and they were encouraged to contact us with any questions or concerns.    Patient verbalized understanding and agreement regarding discussed information via verbal feedback. Pt escorted to scheduling.

## 2025-07-08 ENCOUNTER — TELEPHONE (OUTPATIENT)
Dept: RADIATION ONCOLOGY | Facility: CLINIC | Age: 70
End: 2025-07-08
Payer: MEDICARE

## 2025-07-08 NOTE — TELEPHONE ENCOUNTER
RN called to confirm that patient wants radiation therapy, she says she has decided against chemo and radiation. She says she will follow up with her surgeon in 6 months and go from there. Dr. Mauro aware.

## 2025-09-05 ENCOUNTER — APPOINTMENT (OUTPATIENT)
Dept: PRIMARY CARE | Facility: CLINIC | Age: 70
End: 2025-09-05
Payer: MEDICARE

## 2025-09-08 ENCOUNTER — APPOINTMENT (OUTPATIENT)
Dept: OTOLARYNGOLOGY | Facility: CLINIC | Age: 70
End: 2025-09-08
Payer: MEDICARE

## 2025-12-03 ENCOUNTER — APPOINTMENT (OUTPATIENT)
Dept: PRIMARY CARE | Facility: CLINIC | Age: 70
End: 2025-12-03
Payer: MEDICARE

## (undated) DEVICE — STRIP, SKIN CLOSURE, COMPOUND BENZION TINCTURE 0.6ML

## (undated) DEVICE — SUTURE, VICRYL, 3-0, 27 IN, SH, VIOLET

## (undated) DEVICE — ELECTRODE, ELECTROSURGICAL, BLADE, INSULATED, ENT/IMA, STERILE

## (undated) DEVICE — STRIP, SKIN CLOSURE, STERI STRIP, REINFORCED, 0.5 X 4 IN

## (undated) DEVICE — WATER STERILE, FOR IRRIGATION, 1000ML, W/HANGER

## (undated) DEVICE — NEEDLE, MERIT ADVANCE, ONE WALL,  21GA X 7.0CM, STANDARD SMOOTH

## (undated) DEVICE — DRAPE, LEGGINGS, 48 X 31 IN, STERILE, LF

## (undated) DEVICE — CLOSURE DEVICE, VASCULAR, MYNX, 5FR

## (undated) DEVICE — CATHETER, INFINITI DIAGNOSTIC, 5 FR 100CM 3DRC, WILLIAMS RIGHT OR NO TORQUE

## (undated) DEVICE — THUNDERBEAT, 5MM, 10CM, INLINE GRIP

## (undated) DEVICE — SUTURE, MONOCRYL PLUS, 4-0, PS-2 UD 27IN

## (undated) DEVICE — ANGIO KIT, LEFT HEART, LF, CUSTOM

## (undated) DEVICE — BLADE, SURGICAL, POLYMER COATED P15, STERILE, DISP

## (undated) DEVICE — DRAPE, TAPE, ORTHO

## (undated) DEVICE — SUTURE, VICRYL, 2-0, 27 IN, SH, UNDYED

## (undated) DEVICE — CATHETER, ANGIO, IMPULSE, FL4, 5 FR X 100 CM

## (undated) DEVICE — SUTURE, VICRYL, 3-0, 54 IN, CTD, UNDYED

## (undated) DEVICE — GLOVE, SURGICAL, PROTEXIS PI , 6.5, PF, LF

## (undated) DEVICE — SUTURE, SILK, 2-0, 30 IN, SH, BLACK

## (undated) DEVICE — SUTURE, VICRYL, 3-0, 27 IN, SH

## (undated) DEVICE — PROBE COVER, INTRAOPERATIVE, 13 X 244CM (5 X 96IN)

## (undated) DEVICE — Device

## (undated) DEVICE — SLEEVE, VASO PRESS, CALF GARMENT, MEDIUM, GREEN

## (undated) DEVICE — APPLICATOR, CHLORAPREP, W/ORANGE TINT, 26ML

## (undated) DEVICE — SUTURE, MONOCRYL, 4-0, 27 IN, PS-2, UNDYED

## (undated) DEVICE — SUTURE, PROLENE, 2-0, 30 IN, SH, BLUE

## (undated) DEVICE — TIP, SUCTION, YANKAUER, BULB, ADULT

## (undated) DEVICE — ACCESS SYSTEM, PINNACLE PRECISION, 5FR X 10CM, ECHOGENIC NEEDLE

## (undated) DEVICE — SOLUTION, IRRIGATION, STERILE WATER, 1000 ML, POUR BOTTLE